# Patient Record
Sex: MALE | Race: WHITE | NOT HISPANIC OR LATINO | Employment: OTHER | URBAN - METROPOLITAN AREA
[De-identification: names, ages, dates, MRNs, and addresses within clinical notes are randomized per-mention and may not be internally consistent; named-entity substitution may affect disease eponyms.]

---

## 2023-10-23 ENCOUNTER — TELEPHONE (OUTPATIENT)
Dept: PRIMARY CARE | Facility: CLINIC | Age: 88
End: 2023-10-23

## 2023-10-23 DIAGNOSIS — Z00.00 WELL ADULT EXAM: ICD-10-CM

## 2023-10-23 DIAGNOSIS — I48.91 ATRIAL FIBRILLATION, UNSPECIFIED TYPE (MULTI): ICD-10-CM

## 2023-10-23 DIAGNOSIS — M47.816 ARTHRITIS, LUMBAR SPINE: Primary | ICD-10-CM

## 2023-10-23 PROBLEM — I35.0 AORTIC STENOSIS: Status: ACTIVE | Noted: 2023-10-23

## 2023-10-23 PROBLEM — C61 ADENOCARCINOMA OF PROSTATE (MULTI): Status: ACTIVE | Noted: 2023-10-23

## 2023-10-23 PROBLEM — E03.9 HYPOTHYROIDISM: Status: ACTIVE | Noted: 2023-10-23

## 2023-10-23 PROBLEM — N28.1 COMPLEX RENAL CYST: Status: ACTIVE | Noted: 2023-10-23

## 2023-10-23 PROBLEM — I25.10 CAD (CORONARY ARTERY DISEASE): Status: ACTIVE | Noted: 2023-10-23

## 2023-10-23 PROBLEM — I10 HYPERTENSION: Status: ACTIVE | Noted: 2023-10-23

## 2023-10-23 PROBLEM — K57.90 DIVERTICULOSIS: Status: ACTIVE | Noted: 2023-10-23

## 2023-10-23 PROBLEM — K21.9 GERD (GASTROESOPHAGEAL REFLUX DISEASE): Status: ACTIVE | Noted: 2023-10-23

## 2023-10-23 PROBLEM — R73.01 IMPAIRED FASTING GLUCOSE: Status: ACTIVE | Noted: 2023-10-23

## 2023-10-23 PROBLEM — R97.21 RISING PSA FOLLOWING TREATMENT FOR MALIGNANT NEOPLASM OF PROSTATE: Status: ACTIVE | Noted: 2023-10-23

## 2023-10-23 PROBLEM — R09.89 BILATERAL CAROTID BRUITS: Status: ACTIVE | Noted: 2023-10-23

## 2023-10-23 PROBLEM — R31.29 MICROSCOPIC HEMATURIA: Status: ACTIVE | Noted: 2023-10-23

## 2023-10-23 PROBLEM — Z85.828 HISTORY OF SKIN CANCER: Status: ACTIVE | Noted: 2023-10-23

## 2023-10-23 PROBLEM — M54.50 LUMBAR BACK PAIN: Status: ACTIVE | Noted: 2023-10-23

## 2023-10-23 PROBLEM — E78.5 HYPERLIPIDEMIA: Status: ACTIVE | Noted: 2023-10-23

## 2023-10-23 PROBLEM — L57.0 ACTINIC KERATOSIS: Status: ACTIVE | Noted: 2023-10-23

## 2023-10-23 NOTE — TELEPHONE ENCOUNTER
Patient contacted office requesting renewal of handicap placard    Please notify regarding the followin.  Placard is ready for mailing or pickup.  2.  Schedule wellness exam in December or January (please notify patient fasting lab work has been ordered which she can do 3 to 5 days prior at any  lab)  Shabbir Vauhgan MD

## 2024-02-06 ENCOUNTER — TELEPHONE (OUTPATIENT)
Dept: PRIMARY CARE | Facility: CLINIC | Age: 89
End: 2024-02-06
Payer: MEDICARE

## 2024-02-06 NOTE — TELEPHONE ENCOUNTER
I spoke with patient.  He was confused and thought that his visit in October was his physical/wellness visit.    Will plan to do physical in April (due to limited slots in April and his travel in March).  He is aware to complete fasting lab work prior to visit    Shabbir Vaughan MD

## 2024-02-06 NOTE — TELEPHONE ENCOUNTER
I was trying to schedule a MWV for Feb 2024 but Mr. Lockwood said it should be a regular office visit.  I mentioned that you ordered fasting labs.  Should he schedule a CPE/MWV or follow up?

## 2024-03-27 DIAGNOSIS — K21.9 GASTROESOPHAGEAL REFLUX DISEASE, UNSPECIFIED WHETHER ESOPHAGITIS PRESENT: ICD-10-CM

## 2024-03-27 RX ORDER — PANTOPRAZOLE SODIUM 40 MG/1
1 TABLET, DELAYED RELEASE ORAL DAILY
COMMUNITY
Start: 2014-11-06 | End: 2024-03-27 | Stop reason: SDUPTHER

## 2024-03-27 RX ORDER — METOPROLOL SUCCINATE 50 MG/1
1 TABLET, EXTENDED RELEASE ORAL DAILY
COMMUNITY
Start: 2017-03-17 | End: 2024-03-27

## 2024-03-27 RX ORDER — ROSUVASTATIN CALCIUM 10 MG/1
1 TABLET, COATED ORAL NIGHTLY
COMMUNITY
Start: 2017-09-06

## 2024-03-27 RX ORDER — AMLODIPINE BESYLATE 2.5 MG/1
2.5 TABLET ORAL
COMMUNITY
Start: 2023-10-09

## 2024-03-27 RX ORDER — LISINOPRIL AND HYDROCHLOROTHIAZIDE 12.5; 2 MG/1; MG/1
1 TABLET ORAL 2 TIMES DAILY
COMMUNITY
Start: 2023-06-30

## 2024-03-27 RX ORDER — FUROSEMIDE 40 MG/1
20 TABLET ORAL EVERY OTHER DAY
COMMUNITY
Start: 2023-09-22

## 2024-03-27 RX ORDER — IBUPROFEN 100 MG/5ML
1 SUSPENSION, ORAL (FINAL DOSE FORM) ORAL DAILY
COMMUNITY
End: 2024-03-27 | Stop reason: ALTCHOICE

## 2024-03-27 RX ORDER — LEVOTHYROXINE SODIUM 50 UG/1
1 TABLET ORAL DAILY
COMMUNITY
Start: 2017-06-15 | End: 2024-06-06 | Stop reason: SDUPTHER

## 2024-03-27 RX ORDER — APIXABAN 5 MG/1
1 TABLET, FILM COATED ORAL 2 TIMES DAILY
COMMUNITY
Start: 2023-09-05 | End: 2024-04-15 | Stop reason: SDUPTHER

## 2024-03-27 RX ORDER — PANTOPRAZOLE SODIUM 40 MG/1
40 TABLET, DELAYED RELEASE ORAL DAILY
Qty: 90 TABLET | Refills: 3 | Status: SHIPPED | OUTPATIENT
Start: 2024-03-27

## 2024-04-08 ENCOUNTER — OFFICE VISIT (OUTPATIENT)
Dept: PRIMARY CARE | Facility: CLINIC | Age: 89
End: 2024-04-08
Payer: MEDICARE

## 2024-04-08 ENCOUNTER — LAB (OUTPATIENT)
Dept: LAB | Facility: LAB | Age: 89
End: 2024-04-08
Payer: MEDICARE

## 2024-04-08 ENCOUNTER — HOSPITAL ENCOUNTER (OUTPATIENT)
Dept: RADIOLOGY | Facility: CLINIC | Age: 89
Discharge: HOME | End: 2024-04-08
Payer: MEDICARE

## 2024-04-08 VITALS
WEIGHT: 200 LBS | TEMPERATURE: 98 F | DIASTOLIC BLOOD PRESSURE: 80 MMHG | HEART RATE: 74 BPM | SYSTOLIC BLOOD PRESSURE: 138 MMHG | OXYGEN SATURATION: 98 %

## 2024-04-08 DIAGNOSIS — I10 PRIMARY HYPERTENSION: ICD-10-CM

## 2024-04-08 DIAGNOSIS — S91.302A WOUND, OPEN, FOOT, LEFT, INITIAL ENCOUNTER: Primary | ICD-10-CM

## 2024-04-08 DIAGNOSIS — Z00.00 WELL ADULT EXAM: ICD-10-CM

## 2024-04-08 DIAGNOSIS — I48.11 LONGSTANDING PERSISTENT ATRIAL FIBRILLATION (MULTI): ICD-10-CM

## 2024-04-08 DIAGNOSIS — S91.302A WOUND, OPEN, FOOT, LEFT, INITIAL ENCOUNTER: ICD-10-CM

## 2024-04-08 LAB
25(OH)D3 SERPL-MCNC: 10 NG/ML (ref 30–100)
ALBUMIN SERPL BCP-MCNC: 4.3 G/DL (ref 3.4–5)
ALP SERPL-CCNC: 57 U/L (ref 33–136)
ALT SERPL W P-5'-P-CCNC: 14 U/L (ref 10–52)
ANION GAP SERPL CALC-SCNC: 13 MMOL/L (ref 10–20)
APPEARANCE UR: CLEAR
AST SERPL W P-5'-P-CCNC: 19 U/L (ref 9–39)
BASOPHILS # BLD AUTO: 0.05 X10*3/UL (ref 0–0.1)
BASOPHILS NFR BLD AUTO: 0.9 %
BILIRUB SERPL-MCNC: 0.9 MG/DL (ref 0–1.2)
BILIRUB UR STRIP.AUTO-MCNC: NEGATIVE MG/DL
BUN SERPL-MCNC: 25 MG/DL (ref 6–23)
CALCIUM SERPL-MCNC: 9.3 MG/DL (ref 8.6–10.6)
CHLORIDE SERPL-SCNC: 103 MMOL/L (ref 98–107)
CHOLEST SERPL-MCNC: 130 MG/DL (ref 0–199)
CHOLESTEROL/HDL RATIO: 2.3
CO2 SERPL-SCNC: 29 MMOL/L (ref 21–32)
COLOR UR: YELLOW
CREAT SERPL-MCNC: 0.97 MG/DL (ref 0.5–1.3)
EGFRCR SERPLBLD CKD-EPI 2021: 74 ML/MIN/1.73M*2
EOSINOPHIL # BLD AUTO: 0.17 X10*3/UL (ref 0–0.4)
EOSINOPHIL NFR BLD AUTO: 3 %
ERYTHROCYTE [DISTWIDTH] IN BLOOD BY AUTOMATED COUNT: 14 % (ref 11.5–14.5)
EST. AVERAGE GLUCOSE BLD GHB EST-MCNC: 123 MG/DL
GLUCOSE SERPL-MCNC: 118 MG/DL (ref 74–99)
GLUCOSE UR STRIP.AUTO-MCNC: ABNORMAL MG/DL
HBA1C MFR BLD: 5.9 %
HCT VFR BLD AUTO: 44.2 % (ref 41–52)
HDLC SERPL-MCNC: 56.9 MG/DL
HGB BLD-MCNC: 14.2 G/DL (ref 13.5–17.5)
HYALINE CASTS #/AREA URNS AUTO: ABNORMAL /LPF
IMM GRANULOCYTES # BLD AUTO: 0.01 X10*3/UL (ref 0–0.5)
IMM GRANULOCYTES NFR BLD AUTO: 0.2 % (ref 0–0.9)
KETONES UR STRIP.AUTO-MCNC: NEGATIVE MG/DL
LDLC SERPL CALC-MCNC: 64 MG/DL
LEUKOCYTE ESTERASE UR QL STRIP.AUTO: NEGATIVE
LYMPHOCYTES # BLD AUTO: 1.08 X10*3/UL (ref 0.8–3)
LYMPHOCYTES NFR BLD AUTO: 19.1 %
MCH RBC QN AUTO: 32.7 PG (ref 26–34)
MCHC RBC AUTO-ENTMCNC: 32.1 G/DL (ref 32–36)
MCV RBC AUTO: 102 FL (ref 80–100)
MONOCYTES # BLD AUTO: 0.66 X10*3/UL (ref 0.05–0.8)
MONOCYTES NFR BLD AUTO: 11.7 %
MUCOUS THREADS #/AREA URNS AUTO: ABNORMAL /LPF
NEUTROPHILS # BLD AUTO: 3.69 X10*3/UL (ref 1.6–5.5)
NEUTROPHILS NFR BLD AUTO: 65.1 %
NITRITE UR QL STRIP.AUTO: NEGATIVE
NON HDL CHOLESTEROL: 73 MG/DL (ref 0–149)
NRBC BLD-RTO: 0 /100 WBCS (ref 0–0)
PH UR STRIP.AUTO: 6.5 [PH]
PLATELET # BLD AUTO: 244 X10*3/UL (ref 150–450)
POTASSIUM SERPL-SCNC: 4.4 MMOL/L (ref 3.5–5.3)
PROT SERPL-MCNC: 6.8 G/DL (ref 6.4–8.2)
PROT UR STRIP.AUTO-MCNC: NEGATIVE MG/DL
RBC # BLD AUTO: 4.34 X10*6/UL (ref 4.5–5.9)
RBC # UR STRIP.AUTO: ABNORMAL /UL
RBC #/AREA URNS AUTO: ABNORMAL /HPF
SODIUM SERPL-SCNC: 141 MMOL/L (ref 136–145)
SP GR UR STRIP.AUTO: 1.02
TRIGL SERPL-MCNC: 47 MG/DL (ref 0–149)
TSH SERPL-ACNC: 3.08 MIU/L (ref 0.44–3.98)
UROBILINOGEN UR STRIP.AUTO-MCNC: NORMAL MG/DL
VLDL: 9 MG/DL (ref 0–40)
WBC # BLD AUTO: 5.7 X10*3/UL (ref 4.4–11.3)
WBC #/AREA URNS AUTO: ABNORMAL /HPF

## 2024-04-08 PROCEDURE — 1159F MED LIST DOCD IN RCRD: CPT | Performed by: INTERNAL MEDICINE

## 2024-04-08 PROCEDURE — 84443 ASSAY THYROID STIM HORMONE: CPT

## 2024-04-08 PROCEDURE — 80061 LIPID PANEL: CPT

## 2024-04-08 PROCEDURE — 83036 HEMOGLOBIN GLYCOSYLATED A1C: CPT

## 2024-04-08 PROCEDURE — 81001 URINALYSIS AUTO W/SCOPE: CPT

## 2024-04-08 PROCEDURE — 82306 VITAMIN D 25 HYDROXY: CPT

## 2024-04-08 PROCEDURE — 85025 COMPLETE CBC W/AUTO DIFF WBC: CPT

## 2024-04-08 PROCEDURE — 80053 COMPREHEN METABOLIC PANEL: CPT

## 2024-04-08 PROCEDURE — 3075F SYST BP GE 130 - 139MM HG: CPT | Performed by: INTERNAL MEDICINE

## 2024-04-08 PROCEDURE — 73630 X-RAY EXAM OF FOOT: CPT | Mod: LEFT SIDE | Performed by: RADIOLOGY

## 2024-04-08 PROCEDURE — 3079F DIAST BP 80-89 MM HG: CPT | Performed by: INTERNAL MEDICINE

## 2024-04-08 PROCEDURE — 36415 COLL VENOUS BLD VENIPUNCTURE: CPT

## 2024-04-08 PROCEDURE — 1036F TOBACCO NON-USER: CPT | Performed by: INTERNAL MEDICINE

## 2024-04-08 PROCEDURE — 99213 OFFICE O/P EST LOW 20 MIN: CPT | Performed by: INTERNAL MEDICINE

## 2024-04-08 PROCEDURE — 73630 X-RAY EXAM OF FOOT: CPT | Mod: LT

## 2024-04-08 ASSESSMENT — ENCOUNTER SYMPTOMS
CHILLS: 0
WOUND: 1
OCCASIONAL FEELINGS OF UNSTEADINESS: 0
PALPITATIONS: 0
HEADACHES: 0
LOSS OF SENSATION IN FEET: 0
FEVER: 0
DEPRESSION: 0
SHORTNESS OF BREATH: 0

## 2024-04-08 NOTE — PATIENT INSTRUCTIONS
Left foot wound with central eschar (initial injury 3 weeks ago, bedside table fell on foot)  X-ray left foot  Lab work today.  Continue daily cleansing with soap and water and covering with antibiotic ointment and dry sterile dressing.  Referral to wound clinic    Persistent atrial fibrillation  Hypertension  Continue current medication regimen for now    Follow-up  Wellness exam next week as scheduled

## 2024-04-08 NOTE — PROGRESS NOTES
Subjective   Patient ID: Davon Lockwood is a 89 y.o. male who presents for Foot Injury (Left foot).    Acute visit    Left foot injury with open wound  3 weeks ago, nightstand fell on left foot with immediate bleeding and bruising.  Patient had a small blood blister at distal aspect which was drained and has improved.  Persistent dorsal foot wound with central eschar.  Also, persistent oozing (patient is on Eliquis)    Patient has been cleaning with soap and water and applying over-the-counter antibiotic ointment and dry dressing every 1 to 2 days.  No fever, chills.  No pain with weightbearing.  Initial swelling has improved.    Foot Injury          Review of Systems   Constitutional:  Negative for chills and fever.   Respiratory:  Negative for shortness of breath.    Cardiovascular:  Negative for chest pain and palpitations.   Skin:  Positive for wound.   Neurological:  Negative for headaches.       Objective   /80 (BP Location: Left arm, Patient Position: Sitting, BP Cuff Size: Adult)   Pulse 74   Temp 36.7 °C (98 °F)   Wt 90.7 kg (200 lb)   SpO2 98%     Physical Exam  Vitals reviewed.   HENT:      Head: Normocephalic.   Cardiovascular:      Rate and Rhythm: Normal rate. Rhythm irregular.   Pulmonary:      Effort: Pulmonary effort is normal.      Breath sounds: Normal breath sounds.   Skin:     Comments: Left foot with open wound with central necrosis.  Slight oozing of blood at periphery.  No purulent drainage.  No surrounding fluctuance or crepitus.  Minimal tenderness along medial aspect.  Left DP pulse intact         Assessment/Plan     Left foot wound with central eschar (initial injury 3 weeks ago, bedside table fell on foot)  X-ray left foot  Lab work today.  Continue daily cleansing with soap and water and covering with antibiotic ointment and dry sterile dressing.  Referral to wound clinic    Persistent atrial fibrillation  Hypertension  Continue current medication regimen for  now    Follow-up  Wellness exam next week as scheduled    Shabbir Vaughan MD

## 2024-04-09 ENCOUNTER — OFFICE VISIT (OUTPATIENT)
Dept: WOUND CARE | Facility: CLINIC | Age: 89
End: 2024-04-09
Payer: MEDICARE

## 2024-04-09 DIAGNOSIS — L97.522 ULCER OF TOE OF LEFT FOOT, WITH FAT LAYER EXPOSED (MULTI): ICD-10-CM

## 2024-04-09 DIAGNOSIS — I73.89 ACROCYANOSIS (CMS-HCC): ICD-10-CM

## 2024-04-09 DIAGNOSIS — I87.2 PERIPHERAL VENOUS INSUFFICIENCY: Primary | ICD-10-CM

## 2024-04-09 PROCEDURE — 11042 DBRDMT SUBQ TIS 1ST 20SQCM/<: CPT

## 2024-04-09 PROCEDURE — 99213 OFFICE O/P EST LOW 20 MIN: CPT | Mod: 25

## 2024-04-11 ENCOUNTER — HOSPITAL ENCOUNTER (OUTPATIENT)
Dept: RADIOLOGY | Facility: CLINIC | Age: 89
Discharge: HOME | End: 2024-04-11
Payer: MEDICARE

## 2024-04-11 DIAGNOSIS — L97.522 ULCER OF TOE OF LEFT FOOT, WITH FAT LAYER EXPOSED (MULTI): ICD-10-CM

## 2024-04-11 DIAGNOSIS — I87.2 PERIPHERAL VENOUS INSUFFICIENCY: ICD-10-CM

## 2024-04-11 PROCEDURE — 73718 MRI LOWER EXTREMITY W/O DYE: CPT | Mod: LEFT SIDE | Performed by: RADIOLOGY

## 2024-04-11 PROCEDURE — 73718 MRI LOWER EXTREMITY W/O DYE: CPT | Mod: LT

## 2024-04-15 ENCOUNTER — HOSPITAL ENCOUNTER (OUTPATIENT)
Dept: VASCULAR MEDICINE | Facility: CLINIC | Age: 89
Discharge: HOME | End: 2024-04-15
Payer: MEDICARE

## 2024-04-15 DIAGNOSIS — I73.9 PERIPHERAL VASCULAR DISEASE, UNSPECIFIED (CMS-HCC): ICD-10-CM

## 2024-04-15 DIAGNOSIS — I87.2 PERIPHERAL VENOUS INSUFFICIENCY: ICD-10-CM

## 2024-04-15 DIAGNOSIS — I48.11 LONGSTANDING PERSISTENT ATRIAL FIBRILLATION (MULTI): ICD-10-CM

## 2024-04-15 DIAGNOSIS — L97.522 ULCER OF TOE OF LEFT FOOT, WITH FAT LAYER EXPOSED (MULTI): ICD-10-CM

## 2024-04-15 PROCEDURE — 93922 UPR/L XTREMITY ART 2 LEVELS: CPT | Performed by: SURGERY

## 2024-04-15 PROCEDURE — 93922 UPR/L XTREMITY ART 2 LEVELS: CPT

## 2024-04-16 ENCOUNTER — APPOINTMENT (OUTPATIENT)
Dept: WOUND CARE | Facility: CLINIC | Age: 89
End: 2024-04-16
Payer: MEDICARE

## 2024-04-17 ENCOUNTER — OFFICE VISIT (OUTPATIENT)
Dept: PRIMARY CARE | Facility: CLINIC | Age: 89
End: 2024-04-17
Payer: MEDICARE

## 2024-04-17 VITALS
SYSTOLIC BLOOD PRESSURE: 130 MMHG | OXYGEN SATURATION: 97 % | DIASTOLIC BLOOD PRESSURE: 78 MMHG | HEIGHT: 70 IN | WEIGHT: 197 LBS | BODY MASS INDEX: 28.2 KG/M2 | HEART RATE: 60 BPM

## 2024-04-17 DIAGNOSIS — K21.9 GASTROESOPHAGEAL REFLUX DISEASE, UNSPECIFIED WHETHER ESOPHAGITIS PRESENT: ICD-10-CM

## 2024-04-17 DIAGNOSIS — Z95.2 S/P AVR (AORTIC VALVE REPLACEMENT): ICD-10-CM

## 2024-04-17 DIAGNOSIS — S92.345D NONDISPLACED FRACTURE OF FOURTH METATARSAL BONE, LEFT FOOT, SUBSEQUENT ENCOUNTER FOR FRACTURE WITH ROUTINE HEALING: ICD-10-CM

## 2024-04-17 DIAGNOSIS — E03.9 ACQUIRED HYPOTHYROIDISM: ICD-10-CM

## 2024-04-17 DIAGNOSIS — Z85.828 HISTORY OF SKIN CANCER: ICD-10-CM

## 2024-04-17 DIAGNOSIS — R97.21 RISING PSA FOLLOWING TREATMENT FOR MALIGNANT NEOPLASM OF PROSTATE: ICD-10-CM

## 2024-04-17 DIAGNOSIS — N28.1 COMPLEX RENAL CYST: ICD-10-CM

## 2024-04-17 DIAGNOSIS — I10 PRIMARY HYPERTENSION: ICD-10-CM

## 2024-04-17 DIAGNOSIS — L97.529: ICD-10-CM

## 2024-04-17 DIAGNOSIS — R31.29 MICROSCOPIC HEMATURIA: ICD-10-CM

## 2024-04-17 DIAGNOSIS — R73.01 IMPAIRED FASTING GLUCOSE: ICD-10-CM

## 2024-04-17 DIAGNOSIS — E78.2 MIXED HYPERLIPIDEMIA: ICD-10-CM

## 2024-04-17 DIAGNOSIS — I25.10 CORONARY ARTERY DISEASE INVOLVING NATIVE CORONARY ARTERY OF NATIVE HEART WITHOUT ANGINA PECTORIS: ICD-10-CM

## 2024-04-17 DIAGNOSIS — Z00.00 MEDICARE ANNUAL WELLNESS VISIT, SUBSEQUENT: Primary | ICD-10-CM

## 2024-04-17 DIAGNOSIS — Z00.00 WELLNESS EXAMINATION: ICD-10-CM

## 2024-04-17 DIAGNOSIS — C61 ADENOCARCINOMA OF PROSTATE (MULTI): ICD-10-CM

## 2024-04-17 DIAGNOSIS — E55.9 VITAMIN D DEFICIENCY: ICD-10-CM

## 2024-04-17 DIAGNOSIS — I48.11 LONGSTANDING PERSISTENT ATRIAL FIBRILLATION (MULTI): ICD-10-CM

## 2024-04-17 PROBLEM — M54.50 LUMBAR BACK PAIN: Status: RESOLVED | Noted: 2023-10-23 | Resolved: 2024-04-17

## 2024-04-17 PROBLEM — N20.0 KIDNEY STONES: Status: ACTIVE | Noted: 2024-04-17

## 2024-04-17 PROBLEM — N13.8 BPH WITH OBSTRUCTION/LOWER URINARY TRACT SYMPTOMS: Status: ACTIVE | Noted: 2021-04-07

## 2024-04-17 PROBLEM — N40.1 BPH WITH OBSTRUCTION/LOWER URINARY TRACT SYMPTOMS: Status: ACTIVE | Noted: 2021-04-07

## 2024-04-17 PROBLEM — N35.919 URETHRAL STRICTURE: Status: ACTIVE | Noted: 2019-06-12

## 2024-04-17 PROCEDURE — G0439 PPPS, SUBSEQ VISIT: HCPCS | Performed by: INTERNAL MEDICINE

## 2024-04-17 PROCEDURE — 3078F DIAST BP <80 MM HG: CPT | Performed by: INTERNAL MEDICINE

## 2024-04-17 PROCEDURE — 3075F SYST BP GE 130 - 139MM HG: CPT | Performed by: INTERNAL MEDICINE

## 2024-04-17 PROCEDURE — 1159F MED LIST DOCD IN RCRD: CPT | Performed by: INTERNAL MEDICINE

## 2024-04-17 PROCEDURE — 1036F TOBACCO NON-USER: CPT | Performed by: INTERNAL MEDICINE

## 2024-04-17 PROCEDURE — UHSPHYS PR UH SELECT PHYSICAL: Performed by: INTERNAL MEDICINE

## 2024-04-17 RX ORDER — ERGOCALCIFEROL 1.25 MG/1
50000 CAPSULE ORAL
Qty: 12 CAPSULE | Refills: 1 | Status: SHIPPED | OUTPATIENT
Start: 2024-04-21 | End: 2024-10-18

## 2024-04-17 NOTE — Clinical Note
Soraya Shea for seeing Ray regarding left foot wound. I see MRI that you ordered is notable for nondisplaced fourth metatarsal fracture. Patient is currently asymptomatic.  I have recommended that he weight-bear as tolerated, but wanted to message you in case you had other recommendations (as I know his next appointment with you is not till next week). Thank you, Jose

## 2024-04-17 NOTE — PATIENT INSTRUCTIONS
Wellness exam/physical ([facility])  Continued well-balanced diet rich in fruits, vegetables, fiber, lean protein recommended  Continued regular exercise 3 days a week recommended (after foot heals)  Continued routine follow-up with ophthalmology and dentistry recommended  Providing a copy of advance directives (DURABLE POWER OF  for healthcare) recommended    Coronary artery disease  Persistent atrial fibrillation  Aortic valve stenosis status post aortic valve replacement  History of diastolic heart failure  Primary hypertension  Hyperlipidemia  Lab work reviewed and values are at goal  Continue current medication regimen  Continue home blood pressure monitoring.  Bring readings and device to next visit.  Continue routine follow-up with [facility] cardiology, Dr. Russell    Hypothyroidism  Continue levothyroxine 50 mcg daily    Impaired fasting glucose (fasting blood sugar 118 and hemoglobin A1c 5.9, stable)  Maintain low carbohydrate/low sugar diet  Continue Jardiance 10 mg daily (originally prescribed for diastolic heart failure per cardiology)    GERD/heartburn  Continue pantoprazole 40 mg daily    Prostate cancer with elevated PSA (remote radiation therapy in 2008)  Complex renal cyst  Continue annual follow-up with [facility] urology, Dr. Miller, next appointment due in November    Chronic microscopic hematuria (history of negative workup including imaging and cystoscopy-last on 1/15/2020 at [facility])  Patient to contact Dr. Miller to review current urinalysis (which has slightly higher presence of microscopic hematuria)    Vitamin D deficiency (new diagnosis)  Start vitamin D 50,000 IU weekly  Vitamin D level to be checked prior to next visit    Traumatic ulcer of dorsal left foot  Nondisplaced fourth metatarsal fracture (identified on MRI), asymptomatic  Continue current daily dressing changes per wound clinic instructions  Continue to wear tennis shoe to provide support to foot, weight-bear as tolerated, use cane as  needed    History of skin cancer/actinic keratoses  Continue routine follow-up with dermatology, Dr. Bandar Mcguire    Follow-up  1.  Office visit in 4 months, August  (Fasting lab work ordered to complete prior to visit)  2.  Wellness exam/physical in 1 year, on/after 4/18/2025

## 2024-04-17 NOTE — PROGRESS NOTES
Davon Lockwood is a 70 year old here for a Medicare Annual Wellness Visit     Health Risk Assessment  In general, health is:  Good     Concerns with balance:  Some concerns     Concerns with teeth or dentures:  No     Concerns with sexual function:  No     Felt anxious, stressed, angry, irritable, lonely, isolated, or had thoughts of hurting themself:  No     Has little interest or pleasure in doing things:  No     Bothered by feeling down, depressed, or hopeless:  No     Needs help with grocery shopping, cooking, housework, bathing, grooming, dressing, eating, sitting or standing, walking, using the toilet, handling finances, taking medications, using the telephone, or driving:  No     Following safety precautions in the home environment and vehicle: removed throw rugs from floors, installed grab bars in the bathroom, handrails in stairwells, having adequate lighting, wearing seatbelt at all times?:  Yes     Smokes cigarettes, vapes, or chew tobacco:  No     Eats healthy foods including fruits, vegetables, whole grains, and fiber-rich foods:  Most days     Number of days per week engages in exercise:  3 times a week     Average alcohol consumption: 5 drinks per week        Current Providers  Specialists: I have reviewed specialist-related care of the patient in the medical record.     Medical/Family history review  Reviewed and updated problem list, medical/surgical/family/social history, medications, and allergies.     Opioid use review  Patient use of opioids:  No           Depression screening  Depression Screening PHQ-2 Score   2/14/2023 0         Depression screening tool completed and reviewed. Based on score and interview, patient is not at risk for depression. Screening tool discussed with patient, and I recommended no further intervention at this time.     Cognitive screening  Mini Cog Score:  5/5     Cognitive screening reviewed and plan:  Yes, no evaluation needed     Functional Observation  Was the  "patient's timed Up & Go test unsteady or >= 12 seconds?  No     Advance Care Planning  End of Life planning discussed, including patient's advanced directive wishes:  Yes    Vision and Hearing assessment  Visual acuity (required for Welcome to Medicare):  Ophthamology  Hearing Evaluation:  Normal hearing    ====================================================    /78 (BP Location: Left arm, Patient Position: Sitting, BP Cuff Size: Adult)   Pulse 60   Ht 1.778 m (5' 10\")   Wt 89.4 kg (197 lb)   SpO2 97%   BMI 28.27 kg/m²     Chief Complaint   Patient presents with    Annual Exam    Medicare Annual Wellness Visit Subsequent       Medicare annual wellness visit    =============================================    Wellness exam/physical ( [facility])    Coronary artery disease  Persistent atrial fibrillation  Aortic valve stenosis status post aortic valve replacement  History of diastolic heart failure  Primary hypertension  Hyperlipidemia  Patient denies any increased dyspnea on exertion, orthopnea, PND.  No increased weight or lower extremity edema.  Lab work reviewed and at goal.  Routine follow-up with [facility] cardiology, Dr. Russell, next appointment in July  BPs: Range between 105-140/65-80    Hypothyroidism  Stable with TSH at goal.  No unintentional weight gain or loss, constipation or diarrhea.    Impaired fasting glucose  Patient maintains proper diet.  Limited exercise since injuring foot.  He is on Jardiance for diastolic heart failure per cardiology.  Hemoglobin A1c 5.9%, stable.     GERD/heartburn  No breakthrough heartburn or dysphagia.  Remains on pantoprazole 40 mg daily.    Prostate cancer with elevated PSA (remote radiation therapy in 2008)  Complex renal cyst  Most recent PSA 8.7 in 9/2023.  Most recent visit with Dr. Miller in November.  Continued watchful waiting with 1 year follow-up recommended.  Continue annual follow-up with [facility] urology, Dr. Miller, next appointment due in " November    Chronic microscopic hematuria   Current urinalysis notable for 11/20 RBC per high-power field.  Patient has history of negative workup including imaging and cystoscopy-last on 1/15/2020 at [facility]    Vitamin D deficiency (new diagnosis)  Vitamin D level is low at 10.  Weekly vitamin D to be initiated.    Traumatic ulcer of dorsal left foot  Nondisplaced fourth metatarsal fracture (identified on MRI), asymptomatic  Patient is being followed in the wound clinic, next appointment next week.  He denies any pain in foot.  He is doing daily dressing changes including cleansing and covering with Betadine and dry sterile bandage.  There is less bleeding.  No purulent drainage.    History of skin cancer/actinic keratoses  Patient continues follow-up with dermatology, Dr. Bandar Mcguire, every 3 to 6 months    Health maintenance  Exercise: Fitness center with treadmill and weights 3 days a week  Ophthalmology: Up-to-date with Dr. Nguyen  Dermatology: See above  Dentistry: Up-to-date with Dr. Jg Crowder  Born in [place].  Education: [employer] for [employer] school, [employer].  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  Second marriage to Shagufta since 1999.          ROS     Physical Exam      Assessment and Plan    Medicare annual wellness visit (subsequent)  Continued well-balanced diet rich in fruits, vegetables, fiber, lean protein recommended  Continued regular exercise 3 days a week recommended (after foot heals)  Continued routine follow-up with ophthalmology and dentistry recommended  Providing a copy of advance directives (DURABLE POWER OF  for healthcare) recommended    ===================================    Wellness exam/physical ( [facility])  Continued well-balanced diet rich in fruits, vegetables, fiber, lean protein recommended  Continued regular exercise 3 days a  week recommended (after foot heals)  Continued routine follow-up with ophthalmology and dentistry recommended  Providing a copy of advance directives (DURABLE POWER OF  for healthcare) recommended    Coronary artery disease  Persistent atrial fibrillation  Aortic valve stenosis status post aortic valve replacement  History of diastolic heart failure  Primary hypertension  Hyperlipidemia  Lab work reviewed and values are at goal  Continue current medication regimen  Continue home blood pressure monitoring.  Bring readings and device to next visit.  Continue routine follow-up with [facility] cardiology, Dr. Russell    Hypothyroidism  Continue levothyroxine 50 mcg daily    Impaired fasting glucose (fasting blood sugar 118 and hemoglobin A1c 5.9, stable)  Maintain low carbohydrate/low sugar diet  Continue Jardiance 10 mg daily (originally prescribed for diastolic heart failure per cardiology)    GERD/heartburn  Continue pantoprazole 40 mg daily    Prostate cancer with elevated PSA (remote radiation therapy in 2008)  Complex renal cyst  Continue annual follow-up with [facility] urology, Dr. Miller, next appointment due in November    Chronic microscopic hematuria (history of negative workup including imaging and cystoscopy-last on 1/15/2020 at [facility])  Patient to contact Dr. Miller to review current urinalysis (which has slightly higher presence of microscopic hematuria)    Vitamin D deficiency (new diagnosis)  Start vitamin D 50,000 IU weekly  Vitamin D level to be checked prior to next visit    Traumatic ulcer of dorsal left foot  Nondisplaced fourth metatarsal fracture (identified on MRI), asymptomatic  Continue current daily dressing changes per wound clinic instructions  Continue to wear tennis shoe to provide support to foot, weight-bear as tolerated, use cane as needed    History of skin cancer/actinic keratoses  Continue routine follow-up with dermatology, Dr. Bandar Mcguire    Follow-up  1.  Office visit in 4 months,  August  (Fasting lab work ordered to complete prior to visit)  2.  Wellness exam/physical in 1 year, on/after 4/18/2025    Shabbir Vaughan MD

## 2024-04-17 NOTE — LETTER
2024    Tony Miller MD  [facility] Department of Urology    Re:  Davon Lockwood ( 10/20/1932)      Dear Tony:    I hope that you are doing well.  I saw our mutual patient, Adebayo, for wellness exam today.    As you know, he follows with you for prostate cancer (remote external beam radiation, elevated PSA) on watchful waiting.  He also has a history of chronic microscopic hematuria, previous workup which included negative cystoscopy in 2020.    Current microscopic urinalysis with higher degree of RBCs.  Patient remains asymptomatic.    I thought I would share this information with you in case there were need for rate to see you sooner than the currently scheduled appointment in November.      Sincerely,        Shabbir Vaughan MD  [facility]  [address] [place] [address], [place] [address]    Phone:  [phone]  Fax:  [phone]

## 2024-04-23 ENCOUNTER — OFFICE VISIT (OUTPATIENT)
Dept: WOUND CARE | Facility: CLINIC | Age: 89
End: 2024-04-23
Payer: MEDICARE

## 2024-04-23 PROCEDURE — 10140 I&D HMTMA SEROMA/FLUID COLLJ: CPT

## 2024-04-24 ENCOUNTER — APPOINTMENT (OUTPATIENT)
Dept: RADIOLOGY | Facility: HOSPITAL | Age: 89
End: 2024-04-24
Payer: MEDICARE

## 2024-04-24 ENCOUNTER — APPOINTMENT (OUTPATIENT)
Dept: VASCULAR MEDICINE | Facility: HOSPITAL | Age: 89
End: 2024-04-24
Payer: MEDICARE

## 2024-04-30 ENCOUNTER — OFFICE VISIT (OUTPATIENT)
Dept: WOUND CARE | Facility: CLINIC | Age: 89
End: 2024-04-30
Payer: MEDICARE

## 2024-04-30 PROCEDURE — 11042 DBRDMT SUBQ TIS 1ST 20SQCM/<: CPT

## 2024-05-07 ENCOUNTER — OFFICE VISIT (OUTPATIENT)
Dept: WOUND CARE | Facility: CLINIC | Age: 89
End: 2024-05-07
Payer: MEDICARE

## 2024-05-07 ENCOUNTER — APPOINTMENT (OUTPATIENT)
Dept: WOUND CARE | Facility: CLINIC | Age: 89
End: 2024-05-07
Payer: MEDICARE

## 2024-05-07 PROCEDURE — 99213 OFFICE O/P EST LOW 20 MIN: CPT | Mod: 25

## 2024-05-07 PROCEDURE — 11042 DBRDMT SUBQ TIS 1ST 20SQCM/<: CPT

## 2024-05-14 ENCOUNTER — OFFICE VISIT (OUTPATIENT)
Dept: WOUND CARE | Facility: CLINIC | Age: 89
End: 2024-05-14
Payer: MEDICARE

## 2024-05-14 ENCOUNTER — APPOINTMENT (OUTPATIENT)
Dept: WOUND CARE | Facility: CLINIC | Age: 89
End: 2024-05-14
Payer: MEDICARE

## 2024-05-14 PROCEDURE — 11042 DBRDMT SUBQ TIS 1ST 20SQCM/<: CPT

## 2024-05-21 ENCOUNTER — APPOINTMENT (OUTPATIENT)
Dept: WOUND CARE | Facility: CLINIC | Age: 89
End: 2024-05-21
Payer: MEDICARE

## 2024-05-28 ENCOUNTER — OFFICE VISIT (OUTPATIENT)
Dept: WOUND CARE | Facility: CLINIC | Age: 89
End: 2024-05-28
Payer: MEDICARE

## 2024-05-28 PROCEDURE — 11042 DBRDMT SUBQ TIS 1ST 20SQCM/<: CPT

## 2024-06-04 ENCOUNTER — APPOINTMENT (OUTPATIENT)
Dept: WOUND CARE | Facility: CLINIC | Age: 89
End: 2024-06-04
Payer: MEDICARE

## 2024-06-06 DIAGNOSIS — E03.9 ACQUIRED HYPOTHYROIDISM: ICD-10-CM

## 2024-06-06 RX ORDER — LEVOTHYROXINE SODIUM 50 UG/1
50 TABLET ORAL DAILY
Qty: 90 TABLET | Refills: 3 | Status: SHIPPED | OUTPATIENT
Start: 2024-06-06

## 2024-06-06 NOTE — TELEPHONE ENCOUNTER
Levothyroxine 50 mcg pended for [facility].    Lab Results   Component Value Date    TSH 3.08 04/08/2024    Please sign.  Thanks

## 2024-06-11 ENCOUNTER — APPOINTMENT (OUTPATIENT)
Dept: WOUND CARE | Facility: CLINIC | Age: 89
End: 2024-06-11
Payer: MEDICARE

## 2024-06-23 DIAGNOSIS — E55.9 VITAMIN D DEFICIENCY: ICD-10-CM

## 2024-06-23 RX ORDER — ERGOCALCIFEROL 1.25 1/1
1 CAPSULE ORAL
Qty: 13 CAPSULE | Refills: 3 | Status: SHIPPED | OUTPATIENT
Start: 2024-06-23

## 2024-07-10 ENCOUNTER — TELEPHONE (OUTPATIENT)
Dept: PRIMARY CARE | Facility: CLINIC | Age: 89
End: 2024-07-10
Payer: MEDICARE

## 2024-07-10 NOTE — TELEPHONE ENCOUNTER
Patient should continue vitamin D 50,000 IU weekly until next visit with me.  Remind him to complete lab work which includes vitamin D level prior to next visit.    Shabbir Vaughan MD

## 2024-07-10 NOTE — TELEPHONE ENCOUNTER
Patient was informed that he should complete labs prior to his visit.  He was also informed that [facility] should have refills left on his Vit D prescription that was sent on 6-23-24 (#13 with 3 refills).

## 2024-08-01 DIAGNOSIS — I10 PRIMARY HYPERTENSION: ICD-10-CM

## 2024-08-01 RX ORDER — LISINOPRIL AND HYDROCHLOROTHIAZIDE 12.5; 2 MG/1; MG/1
1 TABLET ORAL 2 TIMES DAILY
Qty: 180 TABLET | Refills: 3 | Status: SHIPPED | OUTPATIENT
Start: 2024-08-01

## 2024-08-14 DIAGNOSIS — E78.2 MIXED HYPERLIPIDEMIA: ICD-10-CM

## 2024-08-14 RX ORDER — ROSUVASTATIN CALCIUM 10 MG/1
10 TABLET, COATED ORAL NIGHTLY
Qty: 90 TABLET | Refills: 1 | Status: SHIPPED | OUTPATIENT
Start: 2024-08-14

## 2024-08-22 ENCOUNTER — APPOINTMENT (OUTPATIENT)
Dept: PRIMARY CARE | Facility: CLINIC | Age: 89
End: 2024-08-22
Payer: MEDICARE

## 2024-08-26 DIAGNOSIS — I48.11 LONGSTANDING PERSISTENT ATRIAL FIBRILLATION (MULTI): ICD-10-CM

## 2024-08-27 RX ORDER — APIXABAN 5 MG/1
5 TABLET, FILM COATED ORAL 2 TIMES DAILY
Qty: 180 TABLET | Refills: 1 | Status: SHIPPED | OUTPATIENT
Start: 2024-08-27

## 2024-08-29 ENCOUNTER — LAB (OUTPATIENT)
Dept: LAB | Facility: LAB | Age: 89
End: 2024-08-29
Payer: MEDICARE

## 2024-08-29 DIAGNOSIS — E55.9 VITAMIN D DEFICIENCY: ICD-10-CM

## 2024-08-29 DIAGNOSIS — E03.9 ACQUIRED HYPOTHYROIDISM: ICD-10-CM

## 2024-08-29 DIAGNOSIS — I48.11 LONGSTANDING PERSISTENT ATRIAL FIBRILLATION (MULTI): ICD-10-CM

## 2024-08-29 DIAGNOSIS — R73.01 IMPAIRED FASTING GLUCOSE: ICD-10-CM

## 2024-08-29 LAB
25(OH)D3 SERPL-MCNC: 84 NG/ML (ref 30–100)
ANION GAP SERPL CALC-SCNC: 16 MMOL/L (ref 10–20)
BASOPHILS # BLD AUTO: 0.04 X10*3/UL (ref 0–0.1)
BASOPHILS NFR BLD AUTO: 0.8 %
BUN SERPL-MCNC: 24 MG/DL (ref 6–23)
CALCIUM SERPL-MCNC: 9.8 MG/DL (ref 8.6–10.6)
CHLORIDE SERPL-SCNC: 101 MMOL/L (ref 98–107)
CO2 SERPL-SCNC: 29 MMOL/L (ref 21–32)
CREAT SERPL-MCNC: 0.97 MG/DL (ref 0.5–1.3)
EGFRCR SERPLBLD CKD-EPI 2021: 74 ML/MIN/1.73M*2
EOSINOPHIL # BLD AUTO: 0.27 X10*3/UL (ref 0–0.4)
EOSINOPHIL NFR BLD AUTO: 5.4 %
ERYTHROCYTE [DISTWIDTH] IN BLOOD BY AUTOMATED COUNT: 14.1 % (ref 11.5–14.5)
GLUCOSE SERPL-MCNC: 130 MG/DL (ref 74–99)
HCT VFR BLD AUTO: 47.8 % (ref 41–52)
HGB BLD-MCNC: 15.5 G/DL (ref 13.5–17.5)
IMM GRANULOCYTES # BLD AUTO: 0.02 X10*3/UL (ref 0–0.5)
IMM GRANULOCYTES NFR BLD AUTO: 0.4 % (ref 0–0.9)
LYMPHOCYTES # BLD AUTO: 1.45 X10*3/UL (ref 0.8–3)
LYMPHOCYTES NFR BLD AUTO: 29.1 %
MCH RBC QN AUTO: 32.8 PG (ref 26–34)
MCHC RBC AUTO-ENTMCNC: 32.4 G/DL (ref 32–36)
MCV RBC AUTO: 101 FL (ref 80–100)
MONOCYTES # BLD AUTO: 0.55 X10*3/UL (ref 0.05–0.8)
MONOCYTES NFR BLD AUTO: 11 %
NEUTROPHILS # BLD AUTO: 2.65 X10*3/UL (ref 1.6–5.5)
NEUTROPHILS NFR BLD AUTO: 53.3 %
NRBC BLD-RTO: 0 /100 WBCS (ref 0–0)
PLATELET # BLD AUTO: 180 X10*3/UL (ref 150–450)
POTASSIUM SERPL-SCNC: 4.1 MMOL/L (ref 3.5–5.3)
RBC # BLD AUTO: 4.72 X10*6/UL (ref 4.5–5.9)
SODIUM SERPL-SCNC: 142 MMOL/L (ref 136–145)
TSH SERPL-ACNC: 2.49 MIU/L (ref 0.44–3.98)
WBC # BLD AUTO: 5 X10*3/UL (ref 4.4–11.3)

## 2024-08-29 PROCEDURE — 36415 COLL VENOUS BLD VENIPUNCTURE: CPT

## 2024-09-04 ENCOUNTER — APPOINTMENT (OUTPATIENT)
Dept: PRIMARY CARE | Facility: CLINIC | Age: 89
End: 2024-09-04
Payer: MEDICARE

## 2024-09-04 VITALS
HEART RATE: 50 BPM | OXYGEN SATURATION: 98 % | BODY MASS INDEX: 27.69 KG/M2 | WEIGHT: 193 LBS | SYSTOLIC BLOOD PRESSURE: 130 MMHG | DIASTOLIC BLOOD PRESSURE: 60 MMHG

## 2024-09-04 DIAGNOSIS — R73.01 IMPAIRED FASTING GLUCOSE: ICD-10-CM

## 2024-09-04 DIAGNOSIS — M47.816 ARTHRITIS, LUMBAR SPINE: ICD-10-CM

## 2024-09-04 DIAGNOSIS — C61 ADENOCARCINOMA OF PROSTATE (MULTI): ICD-10-CM

## 2024-09-04 DIAGNOSIS — I48.11 LONGSTANDING PERSISTENT ATRIAL FIBRILLATION (MULTI): Primary | ICD-10-CM

## 2024-09-04 DIAGNOSIS — I25.10 CORONARY ARTERY DISEASE INVOLVING NATIVE CORONARY ARTERY OF NATIVE HEART WITHOUT ANGINA PECTORIS: ICD-10-CM

## 2024-09-04 DIAGNOSIS — Z85.828 HISTORY OF SKIN CANCER: ICD-10-CM

## 2024-09-04 DIAGNOSIS — I10 PRIMARY HYPERTENSION: ICD-10-CM

## 2024-09-04 DIAGNOSIS — Z95.2 S/P AVR (AORTIC VALVE REPLACEMENT): ICD-10-CM

## 2024-09-04 DIAGNOSIS — K21.9 GASTROESOPHAGEAL REFLUX DISEASE, UNSPECIFIED WHETHER ESOPHAGITIS PRESENT: ICD-10-CM

## 2024-09-04 DIAGNOSIS — E78.2 MIXED HYPERLIPIDEMIA: ICD-10-CM

## 2024-09-04 DIAGNOSIS — E55.9 VITAMIN D DEFICIENCY: ICD-10-CM

## 2024-09-04 DIAGNOSIS — E03.9 ACQUIRED HYPOTHYROIDISM: ICD-10-CM

## 2024-09-04 DIAGNOSIS — R26.9 GAIT DISORDER: ICD-10-CM

## 2024-09-04 PROBLEM — S92.345D: Status: RESOLVED | Noted: 2024-04-17 | Resolved: 2024-09-04

## 2024-09-04 PROBLEM — L97.529: Status: RESOLVED | Noted: 2024-04-17 | Resolved: 2024-09-04

## 2024-09-04 PROCEDURE — 99214 OFFICE O/P EST MOD 30 MIN: CPT | Performed by: INTERNAL MEDICINE

## 2024-09-04 PROCEDURE — 3075F SYST BP GE 130 - 139MM HG: CPT | Performed by: INTERNAL MEDICINE

## 2024-09-04 PROCEDURE — 1036F TOBACCO NON-USER: CPT | Performed by: INTERNAL MEDICINE

## 2024-09-04 PROCEDURE — 3078F DIAST BP <80 MM HG: CPT | Performed by: INTERNAL MEDICINE

## 2024-09-04 PROCEDURE — 1159F MED LIST DOCD IN RCRD: CPT | Performed by: INTERNAL MEDICINE

## 2024-09-04 RX ORDER — ACETAMINOPHEN 500 MG
2000 TABLET ORAL DAILY
Start: 2024-09-04

## 2024-09-04 ASSESSMENT — ENCOUNTER SYMPTOMS
HEADACHES: 0
PALPITATIONS: 0
WEAKNESS: 0
DIZZINESS: 0
DIARRHEA: 0
COUGH: 0
DYSURIA: 0
BLOOD IN STOOL: 0
FATIGUE: 0
CONSTIPATION: 0
BACK PAIN: 1
HEMATURIA: 0

## 2024-09-04 NOTE — PATIENT INSTRUCTIONS
Coronary artery disease  Persistent atrial fibrillation  Aortic valve stenosis status post aortic valve replacement  History of diastolic heart failure  Primary hypertension  Hyperlipidemia  Continue current medication regimen  Continue home blood pressure monitoring.  Bring readings and device to next visit.  Continue routine follow-up with [facility] cardiology, Dr. Russell, next visit in 2/2025     Hypothyroidism  Continue levothyroxine 50 mcg daily     Impaired fasting glucose   Maintain low carbohydrate/low sugar diet  Continue Jardiance 10 mg daily (originally prescribed for diastolic heart failure per cardiology)     GERD/heartburn  Continue pantoprazole 40 mg daily     Prostate cancer with elevated PSA (remote radiation therapy in 2008)  Complex renal cyst  Continue annual follow-up with [facility] urology, Dr. Miller, next appointment in November     Chronic microscopic hematuria (history of negative workup including imaging and cystoscopy-last on 1/15/2020 at [facility])  Monitor expectantly with urology     Vitamin D deficiency (vitamin D level improved on prescription vitamin D)  Stop vitamin D 50,000 IU weekly  Start over-the-counter vitamin D 2000 IU daily (with food)     History of skin cancer/actinic keratoses  Continue routine follow-up with dermatology, Dr. Bandar Mcguire     Lumbar degenerative arthritis with kyphosis/secondary gait disorder  Keep active as able  Option of referral to physical therapy discussed, patient to consider in future  Continued use of ambulatory ambulatory device recommended, currently cane.  Option of rollator discussed.    Health maintenance  High-dose influenza and COVID-19 vaccine recommended in September    Follow-up  1.  Office visit in December  (Fasting lab work to be done 3 to 5 days prior)  2.  Wellness exam/physical in April 2025 (currently scheduled)

## 2024-09-04 NOTE — PROGRESS NOTES
Subjective   Patient ID: Davon Lockwood is a 89 y.o. male who presents for Follow-up.    Routine follow-up    Coronary artery disease  Persistent atrial fibrillation  Aortic valve stenosis status post aortic valve replacement  History of diastolic heart failure  Primary hypertension  Hyperlipidemia  No exertional chest pain or shortness of breath, palpitations, lightheadedness.  No black stools or blood in urine.  Continue current medication regimen  [facility] cardiology, Dr. Russell, next visit in 2/2025     Hypothyroidism  No unintentional weight gain or loss, constipation or diarrhea.  TSH is at goal on current dose of levothyroxine.     Impaired fasting glucose   Patient maintains proper diet, though he does like carbohydrates.  He remains active with exercise.  He is on Jardiance for heart failure which has been beneficial for glucoses as well    GERD/heartburn  No breakthrough symptoms or dysphagia.  Patient remains on PPI therapy.     Prostate cancer with elevated PSA (remote radiation therapy in 2008)  Complex renal cyst  Annual follow-up with [facility] urology, Dr. Miller, next appointment scheduled in November     Chronic microscopic hematuria   Prior negative workup with cystoscopy completed in 1/2020      Vitamin D deficiency  Vitamin D 50,000 IU weekly started at last visit in April.   Current vitamin D level is 84, plan to change to daily lower dose of vitamin D     History of skin cancer/actinic keratoses  Patient with recent squamous cell carcinoma removed, other upcoming Mohs procedure scheduled with Dr. Whitaker     Lumbar degenerative arthritis with kyphosis/secondary gait disorder  Patient is using a cane for support.  He has minimal back pain and denies any radicular leg pain/numbness/weakness  He does have postural kyphosis as well.  Option of referral to physical therapy for potential benefit of posture discussed which patient would like to defer at this time.  Use of alternative ambulatory device such  as rollator suggested    Health maintenance  Patient continues using treadmill and doing upper extremity exercise at fitness center 3 days a week  High-dose influenza and COVID-19 vaccine recommended in September, discussed             Review of Systems   Constitutional:  Negative for fatigue.   Respiratory:  Negative for cough.    Cardiovascular:  Negative for chest pain, palpitations and leg swelling.   Gastrointestinal:  Negative for blood in stool, constipation and diarrhea.   Genitourinary:  Negative for dysuria and hematuria.   Musculoskeletal:  Positive for back pain and gait problem.   Neurological:  Negative for dizziness, weakness and headaches.       Objective   /60 (BP Location: Left arm, Patient Position: Sitting, BP Cuff Size: Adult)   Pulse 50   Wt 87.5 kg (193 lb)   SpO2 98%   BMI 27.69 kg/m²     Physical Exam  Vitals reviewed.   HENT:      Mouth/Throat:      Mouth: Mucous membranes are moist.   Eyes:      Conjunctiva/sclera: Conjunctivae normal.   Cardiovascular:      Rate and Rhythm: Normal rate. Rhythm irregular.      Heart sounds: No murmur heard.  Pulmonary:      Effort: Pulmonary effort is normal.      Breath sounds: Normal breath sounds. No rales.   Abdominal:      General: Bowel sounds are normal.      Tenderness: There is no abdominal tenderness.   Musculoskeletal:      Right lower leg: No edema.      Left lower leg: No edema.   Neurological:      Comments: Motor exam: 5/5 strength in both lower extremities   Psychiatric:         Mood and Affect: Mood normal.         Assessment/Plan     Coronary artery disease  Persistent atrial fibrillation  Aortic valve stenosis status post aortic valve replacement  History of diastolic heart failure  Primary hypertension  Hyperlipidemia  Continue current medication regimen  Continue home blood pressure monitoring.  Bring readings and device to next visit.  Continue routine follow-up with [facility] cardiology, Dr. Russell, next visit in 2/2025      Hypothyroidism  Continue levothyroxine 50 mcg daily     Impaired fasting glucose   Maintain low carbohydrate/low sugar diet  Continue Jardiance 10 mg daily (originally prescribed for diastolic heart failure per cardiology)     GERD/heartburn  Continue pantoprazole 40 mg daily     Prostate cancer with elevated PSA (remote radiation therapy in 2008)  Complex renal cyst  Continue annual follow-up with [facility] urology, Dr. Miller, next appointment in November     Chronic microscopic hematuria (history of negative workup including imaging and cystoscopy-last on 1/15/2020 at [facility])  Monitor expectantly with urology     Vitamin D deficiency (vitamin D level improved on prescription vitamin D)  Stop vitamin D 50,000 IU weekly  Start over-the-counter vitamin D 2000 IU daily (with food)     History of skin cancer/actinic keratoses  Continue routine follow-up with dermatology, Dr. Bandar Mcguire     Lumbar degenerative arthritis with kyphosis/secondary gait disorder  Keep active as able  Option of referral to physical therapy discussed, patient to consider in future  Continued use of ambulatory ambulatory device recommended, currently cane.  Option of rollator discussed.    Health maintenance  High-dose influenza and COVID-19 vaccine recommended in September    Follow-up  1.  Office visit in December  (Fasting lab work to be done 3 to 5 days prior)  2.  Wellness exam/physical in April 2025 (currently scheduled)    Shabbir Vaughan MD

## 2024-11-14 ENCOUNTER — TELEPHONE (OUTPATIENT)
Dept: PRIMARY CARE | Facility: CLINIC | Age: 89
End: 2024-11-14
Payer: MEDICARE

## 2024-11-14 DIAGNOSIS — R05.1 ACUTE COUGH: ICD-10-CM

## 2024-11-14 RX ORDER — BENZONATATE 100 MG/1
100 CAPSULE ORAL 3 TIMES DAILY PRN
Qty: 42 CAPSULE | Refills: 0 | Status: SHIPPED | OUTPATIENT
Start: 2024-11-14 | End: 2024-12-14

## 2024-11-14 NOTE — TELEPHONE ENCOUNTER
Prescription has been signed and sent.  Please advise patient to keep us posted if his symptoms do not improve or at any point get worse.    Shabbir Vaughan MD

## 2024-11-14 NOTE — TELEPHONE ENCOUNTER
Ray complains of cough, ST, laryngitis. No fever. He would a prescription for Tessalon perles.  Please sign.  Thanks

## 2024-11-23 DIAGNOSIS — I10 PRIMARY HYPERTENSION: ICD-10-CM

## 2024-11-23 RX ORDER — AMLODIPINE BESYLATE 2.5 MG/1
2.5 TABLET ORAL DAILY
Qty: 90 TABLET | Refills: 3 | Status: SHIPPED | OUTPATIENT
Start: 2024-11-23

## 2024-11-26 ENCOUNTER — LAB (OUTPATIENT)
Dept: LAB | Facility: LAB | Age: 89
End: 2024-11-26
Payer: MEDICARE

## 2024-11-26 DIAGNOSIS — R73.01 IMPAIRED FASTING GLUCOSE: ICD-10-CM

## 2024-11-26 DIAGNOSIS — E55.9 VITAMIN D DEFICIENCY: ICD-10-CM

## 2024-11-26 DIAGNOSIS — I48.11 LONGSTANDING PERSISTENT ATRIAL FIBRILLATION (MULTI): ICD-10-CM

## 2024-11-26 LAB
25(OH)D3 SERPL-MCNC: 52 NG/ML (ref 30–100)
ANION GAP SERPL CALC-SCNC: 11 MMOL/L (ref 10–20)
BASOPHILS # BLD AUTO: 0.03 X10*3/UL (ref 0–0.1)
BASOPHILS NFR BLD AUTO: 0.6 %
BUN SERPL-MCNC: 31 MG/DL (ref 6–23)
CALCIUM SERPL-MCNC: 9.8 MG/DL (ref 8.6–10.6)
CHLORIDE SERPL-SCNC: 101 MMOL/L (ref 98–107)
CO2 SERPL-SCNC: 34 MMOL/L (ref 21–32)
CREAT SERPL-MCNC: 1.12 MG/DL (ref 0.5–1.3)
EGFRCR SERPLBLD CKD-EPI 2021: 62 ML/MIN/1.73M*2
EOSINOPHIL # BLD AUTO: 0.26 X10*3/UL (ref 0–0.4)
EOSINOPHIL NFR BLD AUTO: 5.4 %
ERYTHROCYTE [DISTWIDTH] IN BLOOD BY AUTOMATED COUNT: 13.2 % (ref 11.5–14.5)
GLUCOSE SERPL-MCNC: 160 MG/DL (ref 74–99)
HCT VFR BLD AUTO: 44.4 % (ref 41–52)
HGB BLD-MCNC: 14.3 G/DL (ref 13.5–17.5)
IMM GRANULOCYTES # BLD AUTO: 0.01 X10*3/UL (ref 0–0.5)
IMM GRANULOCYTES NFR BLD AUTO: 0.2 % (ref 0–0.9)
LYMPHOCYTES # BLD AUTO: 1.13 X10*3/UL (ref 0.8–3)
LYMPHOCYTES NFR BLD AUTO: 23.4 %
MCH RBC QN AUTO: 33.5 PG (ref 26–34)
MCHC RBC AUTO-ENTMCNC: 32.2 G/DL (ref 32–36)
MCV RBC AUTO: 104 FL (ref 80–100)
MONOCYTES # BLD AUTO: 0.53 X10*3/UL (ref 0.05–0.8)
MONOCYTES NFR BLD AUTO: 11 %
NEUTROPHILS # BLD AUTO: 2.87 X10*3/UL (ref 1.6–5.5)
NEUTROPHILS NFR BLD AUTO: 59.4 %
NRBC BLD-RTO: 0 /100 WBCS (ref 0–0)
PLATELET # BLD AUTO: 166 X10*3/UL (ref 150–450)
POTASSIUM SERPL-SCNC: 4 MMOL/L (ref 3.5–5.3)
RBC # BLD AUTO: 4.27 X10*6/UL (ref 4.5–5.9)
SODIUM SERPL-SCNC: 142 MMOL/L (ref 136–145)
WBC # BLD AUTO: 4.8 X10*3/UL (ref 4.4–11.3)

## 2024-11-26 PROCEDURE — 85025 COMPLETE CBC W/AUTO DIFF WBC: CPT

## 2024-11-26 PROCEDURE — 36415 COLL VENOUS BLD VENIPUNCTURE: CPT

## 2024-11-26 PROCEDURE — 80048 BASIC METABOLIC PNL TOTAL CA: CPT

## 2024-11-26 PROCEDURE — 82306 VITAMIN D 25 HYDROXY: CPT

## 2024-12-04 ENCOUNTER — APPOINTMENT (OUTPATIENT)
Dept: PRIMARY CARE | Facility: CLINIC | Age: 89
End: 2024-12-04
Payer: MEDICARE

## 2024-12-04 VITALS
HEART RATE: 50 BPM | BODY MASS INDEX: 27.41 KG/M2 | WEIGHT: 191 LBS | SYSTOLIC BLOOD PRESSURE: 130 MMHG | OXYGEN SATURATION: 95 % | DIASTOLIC BLOOD PRESSURE: 62 MMHG

## 2024-12-04 DIAGNOSIS — R73.01 IMPAIRED FASTING GLUCOSE: ICD-10-CM

## 2024-12-04 DIAGNOSIS — I48.11 LONGSTANDING PERSISTENT ATRIAL FIBRILLATION (MULTI): Primary | ICD-10-CM

## 2024-12-04 DIAGNOSIS — Z85.828 HISTORY OF SKIN CANCER: ICD-10-CM

## 2024-12-04 DIAGNOSIS — J30.0 VASOMOTOR RHINITIS: ICD-10-CM

## 2024-12-04 DIAGNOSIS — L98.9 SKIN LESION OF RIGHT ARM: ICD-10-CM

## 2024-12-04 DIAGNOSIS — Z23 NEED FOR VACCINATION: ICD-10-CM

## 2024-12-04 DIAGNOSIS — E03.9 ACQUIRED HYPOTHYROIDISM: ICD-10-CM

## 2024-12-04 DIAGNOSIS — E55.9 VITAMIN D DEFICIENCY: ICD-10-CM

## 2024-12-04 DIAGNOSIS — E78.2 MIXED HYPERLIPIDEMIA: ICD-10-CM

## 2024-12-04 DIAGNOSIS — I25.10 CORONARY ARTERY DISEASE INVOLVING NATIVE CORONARY ARTERY OF NATIVE HEART WITHOUT ANGINA PECTORIS: ICD-10-CM

## 2024-12-04 DIAGNOSIS — R97.21 RISING PSA FOLLOWING TREATMENT FOR MALIGNANT NEOPLASM OF PROSTATE: ICD-10-CM

## 2024-12-04 DIAGNOSIS — C61 ADENOCARCINOMA OF PROSTATE (MULTI): ICD-10-CM

## 2024-12-04 DIAGNOSIS — M47.816 ARTHRITIS, LUMBAR SPINE: ICD-10-CM

## 2024-12-04 DIAGNOSIS — I10 PRIMARY HYPERTENSION: ICD-10-CM

## 2024-12-04 PROCEDURE — 3075F SYST BP GE 130 - 139MM HG: CPT | Performed by: INTERNAL MEDICINE

## 2024-12-04 PROCEDURE — 1159F MED LIST DOCD IN RCRD: CPT | Performed by: INTERNAL MEDICINE

## 2024-12-04 PROCEDURE — 90677 PCV20 VACCINE IM: CPT | Performed by: INTERNAL MEDICINE

## 2024-12-04 PROCEDURE — G0009 ADMIN PNEUMOCOCCAL VACCINE: HCPCS | Performed by: INTERNAL MEDICINE

## 2024-12-04 PROCEDURE — 3078F DIAST BP <80 MM HG: CPT | Performed by: INTERNAL MEDICINE

## 2024-12-04 PROCEDURE — 99214 OFFICE O/P EST MOD 30 MIN: CPT | Performed by: INTERNAL MEDICINE

## 2024-12-04 NOTE — PROGRESS NOTES
Subjective   Patient ID: Davon Lockwood is a 89 y.o. male who presents for Follow-up.    Coronary artery disease  Persistent atrial fibrillation, slow ventricular rate  Aortic valve stenosis status post aortic valve replacement  History of diastolic heart failure  Primary hypertension  Hyperlipidemia  No VILLALPANDO, chest pain, palpitations.  Slow HR, though no symptoms of dizziness.  Routine follow-up with [facility] cardiology, Dr. Russell, next visit in 2/2025     Hypothyroidism  Stable on treatment     Impaired fasting glucose   On Jardiance for CHF.  Glucose stable.    GERD/heartburn  No symptoms on pantoprazole 40 mg daily     Prostate cancer with elevated PSA (remote radiation therapy in 2008).  Current PSA 14.1  Complex renal cyst  Recent visit with Dr. Miller, no intervention, follow-up in 1 year    Vitamin D deficiency   Stable on vitamin D 2000 IU daily (with food)    Suspected right upper extremity skin cancer (2 locations)  History of skin cancer/actinic keratoses  Upcoming appointment with Dr. Mcguire scheduled in January.  Patient advised to have him review areas on right upper extremity     Lumbar degenerative arthritis with kyphosis/secondary gait disorder  Keep active as able  Continue use of cane    Vasomotor rhinitis  Option of ipratropium nasal spray discussed, patient defers at this time    Potential bilateral hearing loss  Option of audiology referral discussed, patient defers at this time     Health maintenance  High-dose influenza and COVID-19 vaccine are up-to-date  Prevnar-20 vaccine given today.    Follow-up  Wellness exam/physical in 4/2025 (as scheduled)  (Fasting lab work be ordered to complete 3 to 7 days prior to visit)         Review of Systems   Constitutional:  Negative for fatigue and unexpected weight change.   Eyes:  Negative for visual disturbance.   Respiratory:  Negative for cough and shortness of breath.    Cardiovascular:  Negative for chest pain, palpitations and leg swelling.    Gastrointestinal:  Negative for abdominal pain, constipation and diarrhea.   Genitourinary:  Negative for dysuria, frequency and urgency.   Musculoskeletal:  Negative for back pain.   Neurological:  Negative for dizziness and headaches.   Psychiatric/Behavioral:  Negative for dysphoric mood. The patient is not nervous/anxious.        Objective   /62 (BP Location: Left arm, Patient Position: Sitting, BP Cuff Size: Adult)   Pulse 50   Wt 86.6 kg (191 lb)   SpO2 95%   BMI 27.41 kg/m²     Physical Exam  Vitals reviewed.   Constitutional:       Appearance: Normal appearance.   HENT:      Head: Normocephalic.      Right Ear: Tympanic membrane normal.      Left Ear: Tympanic membrane normal.      Mouth/Throat:      Mouth: Mucous membranes are moist.      Pharynx: No oropharyngeal exudate or posterior oropharyngeal erythema.   Eyes:      Conjunctiva/sclera: Conjunctivae normal.   Neck:      Vascular: No carotid bruit.   Cardiovascular:      Rate and Rhythm: Normal rate and regular rhythm.      Pulses: Normal pulses.   Pulmonary:      Effort: Pulmonary effort is normal.      Breath sounds: Normal breath sounds. No wheezing or rales.   Abdominal:      General: Bowel sounds are normal.      Palpations: Abdomen is soft.      Tenderness: There is no abdominal tenderness.   Musculoskeletal:      Right lower leg: No edema.      Left lower leg: No edema.      Comments: Kyphoscoliosis   Lymphadenopathy:      Cervical: No cervical adenopathy.   Skin:     General: Skin is warm.      Comments: Various scaly areas on scalp  Right upper extremity with 2 scaly areas concerning for possible carcinoma   Neurological:      General: No focal deficit present.      Mental Status: He is alert and oriented to person, place, and time.   Psychiatric:         Mood and Affect: Mood normal.         Assessment/Plan     Coronary artery disease  Persistent atrial fibrillation, slow ventricular rate  Aortic valve stenosis status post aortic  valve replacement  History of diastolic heart failure  Primary hypertension  Hyperlipidemia  Continue current medication regimen  Continue home blood pressure monitoring.  Bring readings and device to next visit.  Continue routine follow-up with [facility] cardiology, Dr. Russell, next visit in 2/2025     Hypothyroidism  Continue levothyroxine 50 mcg daily     Impaired fasting glucose   Maintain low carbohydrate/low sugar diet  Continue Jardiance 10 mg daily (originally prescribed for diastolic heart failure per cardiology)     GERD/heartburn  Continue pantoprazole 40 mg daily     Prostate cancer with elevated PSA (remote radiation therapy in 2008).  Current PSA 14.1  Complex renal cyst  Continue annual follow-up with [facility] urology, Dr. Miller,     Vitamin D deficiency   Continue vitamin D 2000 IU daily (with food)    Suspected right upper extremity skin cancer (2 locations)  History of skin cancer/actinic keratoses  Upcoming appointment with Dr. Mcguire scheduled in January.  Patient advised to have him review areas on right upper extremity     Lumbar degenerative arthritis with kyphosis/secondary gait disorder  Keep active as able  Continue use of cane    Vasomotor rhinitis  Option of ipratropium nasal spray discussed, patient defers at this time    Potential bilateral hearing loss  Option of audiology referral discussed, patient defers at this time     Health maintenance  High-dose influenza and COVID-19 vaccine are up-to-date  Prevnar-20 vaccine given today.    Follow-up  Wellness exam/physical in 4/2025 (as scheduled)  (Fasting lab work be ordered to complete 3 to 7 days prior to visit)    Shabbir Vaughan MD

## 2024-12-04 NOTE — PATIENT INSTRUCTIONS
Coronary artery disease  Persistent atrial fibrillation, slow ventricular rate  Aortic valve stenosis status post aortic valve replacement  History of diastolic heart failure  Primary hypertension  Hyperlipidemia  Continue current medication regimen  Continue home blood pressure monitoring.  Bring readings and device to next visit.  Continue routine follow-up with [facility] cardiology, Dr. Russell, next visit in 2/2025     Hypothyroidism  Continue levothyroxine 50 mcg daily     Impaired fasting glucose   Maintain low carbohydrate/low sugar diet  Continue Jardiance 10 mg daily (originally prescribed for diastolic heart failure per cardiology)     GERD/heartburn  Continue pantoprazole 40 mg daily     Prostate cancer with elevated PSA (remote radiation therapy in 2008).  Current PSA 14.1  Complex renal cyst  Continue annual follow-up with [facility] urology, Dr. Miller,     Vitamin D deficiency   Continue vitamin D 2000 IU daily (with food)    Suspected right upper extremity skin cancer (2 locations)  History of skin cancer/actinic keratoses  Upcoming appointment with Dr. Mcguire scheduled in January.  Patient advised to have him review areas on right upper extremity     Lumbar degenerative arthritis with kyphosis/secondary gait disorder  Keep active as able  Continue use of cane    Vasomotor rhinitis  Option of ipratropium nasal spray discussed, patient defers at this time    Potential bilateral hearing loss  Option of audiology referral discussed, patient defers at this time     Health maintenance  High-dose influenza and COVID-19 vaccine are up-to-date  Prevnar-20 vaccine given today.    Follow-up  Wellness exam/physical in 4/2025 (as scheduled)  (Fasting lab work be ordered to complete 3 to 7 days prior to visit)

## 2024-12-05 DIAGNOSIS — J00 ACUTE RHINITIS: ICD-10-CM

## 2024-12-05 NOTE — TELEPHONE ENCOUNTER
Adebayo would like to try Ipratropium nasal spray.  Pended for [facility] pharmacy.  Please sign.  Thanks

## 2024-12-06 RX ORDER — IPRATROPIUM BROMIDE 21 UG/1
2 SPRAY, METERED NASAL 2 TIMES DAILY
Qty: 30 ML | Refills: 3 | Status: SHIPPED | OUTPATIENT
Start: 2024-12-06 | End: 2025-12-06

## 2024-12-07 DIAGNOSIS — Z00.00 WELLNESS EXAMINATION: Primary | ICD-10-CM

## 2024-12-07 ASSESSMENT — ENCOUNTER SYMPTOMS
DYSURIA: 0
PALPITATIONS: 0
COUGH: 0
SHORTNESS OF BREATH: 0
DIZZINESS: 0
ABDOMINAL PAIN: 0
HEADACHES: 0
CONSTIPATION: 0
DIARRHEA: 0
FATIGUE: 0
UNEXPECTED WEIGHT CHANGE: 0
FREQUENCY: 0
DYSPHORIC MOOD: 0
NERVOUS/ANXIOUS: 0
BACK PAIN: 0

## 2025-01-01 DIAGNOSIS — J00 ACUTE RHINITIS: ICD-10-CM

## 2025-01-02 RX ORDER — IPRATROPIUM BROMIDE 21 UG/1
2 SPRAY, METERED NASAL 2 TIMES DAILY
Qty: 30 ML | Refills: 5 | Status: SHIPPED | OUTPATIENT
Start: 2025-01-02 | End: 2026-01-02

## 2025-01-04 DIAGNOSIS — E78.2 MIXED HYPERLIPIDEMIA: ICD-10-CM

## 2025-01-04 RX ORDER — ROSUVASTATIN CALCIUM 10 MG/1
10 TABLET, COATED ORAL NIGHTLY
Qty: 90 TABLET | Refills: 3 | Status: SHIPPED | OUTPATIENT
Start: 2025-01-04

## 2025-01-16 DIAGNOSIS — J00 ACUTE RHINITIS: ICD-10-CM

## 2025-01-16 RX ORDER — IPRATROPIUM BROMIDE 21 UG/1
SPRAY, METERED NASAL
Qty: 30 ML | Refills: 11 | Status: SHIPPED | OUTPATIENT
Start: 2025-01-16

## 2025-01-26 DIAGNOSIS — I48.11 LONGSTANDING PERSISTENT ATRIAL FIBRILLATION (MULTI): ICD-10-CM

## 2025-01-26 DIAGNOSIS — J00 ACUTE RHINITIS: ICD-10-CM

## 2025-01-26 RX ORDER — APIXABAN 5 MG/1
5 TABLET, FILM COATED ORAL 2 TIMES DAILY
Qty: 180 TABLET | Refills: 1 | Status: SHIPPED | OUTPATIENT
Start: 2025-01-26

## 2025-01-26 RX ORDER — IPRATROPIUM BROMIDE 21 UG/1
SPRAY, METERED NASAL
Qty: 30 ML | Refills: 4 | Status: SHIPPED | OUTPATIENT
Start: 2025-01-26

## 2025-02-19 DIAGNOSIS — K21.9 GASTROESOPHAGEAL REFLUX DISEASE, UNSPECIFIED WHETHER ESOPHAGITIS PRESENT: ICD-10-CM

## 2025-02-19 RX ORDER — PANTOPRAZOLE SODIUM 40 MG/1
40 TABLET, DELAYED RELEASE ORAL DAILY
Qty: 90 TABLET | Refills: 3 | Status: SHIPPED | OUTPATIENT
Start: 2025-02-19

## 2025-04-09 DIAGNOSIS — E03.9 ACQUIRED HYPOTHYROIDISM: ICD-10-CM

## 2025-04-09 RX ORDER — LEVOTHYROXINE SODIUM 50 UG/1
50 TABLET ORAL DAILY
Qty: 90 TABLET | Refills: 3 | Status: SHIPPED | OUTPATIENT
Start: 2025-04-09

## 2025-04-17 ENCOUNTER — LAB (OUTPATIENT)
Dept: LAB | Facility: HOSPITAL | Age: OVER 89
End: 2025-04-17
Payer: MEDICARE

## 2025-04-17 DIAGNOSIS — Z00.00 ENCOUNTER FOR GENERAL ADULT MEDICAL EXAMINATION WITHOUT ABNORMAL FINDINGS: Primary | ICD-10-CM

## 2025-04-17 DIAGNOSIS — Z00.00 WELLNESS EXAMINATION: ICD-10-CM

## 2025-04-17 LAB
25(OH)D3 SERPL-MCNC: 49 NG/ML (ref 30–100)
ALBUMIN SERPL BCP-MCNC: 4.6 G/DL (ref 3.4–5)
ALP SERPL-CCNC: 48 U/L (ref 33–136)
ALT SERPL W P-5'-P-CCNC: 15 U/L (ref 10–52)
ANION GAP SERPL CALC-SCNC: 15 MMOL/L (ref 10–20)
APPEARANCE UR: CLEAR
AST SERPL W P-5'-P-CCNC: 20 U/L (ref 9–39)
BASOPHILS # BLD AUTO: 0.03 X10*3/UL (ref 0–0.1)
BASOPHILS NFR BLD AUTO: 0.6 %
BILIRUB SERPL-MCNC: 1.4 MG/DL (ref 0–1.2)
BILIRUB UR STRIP.AUTO-MCNC: NEGATIVE MG/DL
BUN SERPL-MCNC: 32 MG/DL (ref 6–23)
CALCIUM SERPL-MCNC: 9.6 MG/DL (ref 8.6–10.6)
CHLORIDE SERPL-SCNC: 102 MMOL/L (ref 98–107)
CHOLEST SERPL-MCNC: 127 MG/DL (ref 0–199)
CHOLESTEROL/HDL RATIO: 2.2
CO2 SERPL-SCNC: 27 MMOL/L (ref 21–32)
COLOR UR: YELLOW
CREAT SERPL-MCNC: 1.09 MG/DL (ref 0.5–1.3)
CRP SERPL HS-MCNC: 0.8 MG/L
EGFRCR SERPLBLD CKD-EPI 2021: 64 ML/MIN/1.73M*2
EOSINOPHIL # BLD AUTO: 0.15 X10*3/UL (ref 0–0.4)
EOSINOPHIL NFR BLD AUTO: 3 %
ERYTHROCYTE [DISTWIDTH] IN BLOOD BY AUTOMATED COUNT: 14.2 % (ref 11.5–14.5)
EST. AVERAGE GLUCOSE BLD GHB EST-MCNC: 117 MG/DL
GLUCOSE SERPL-MCNC: 110 MG/DL (ref 74–99)
GLUCOSE UR STRIP.AUTO-MCNC: ABNORMAL MG/DL
HBA1C MFR BLD: 5.7 % (ref ?–5.7)
HCT VFR BLD AUTO: 47.5 % (ref 41–52)
HDLC SERPL-MCNC: 56.7 MG/DL
HGB BLD-MCNC: 14.7 G/DL (ref 13.5–17.5)
IMM GRANULOCYTES # BLD AUTO: 0.01 X10*3/UL (ref 0–0.5)
IMM GRANULOCYTES NFR BLD AUTO: 0.2 % (ref 0–0.9)
KETONES UR STRIP.AUTO-MCNC: NEGATIVE MG/DL
LDLC SERPL CALC-MCNC: 59 MG/DL
LEUKOCYTE ESTERASE UR QL STRIP.AUTO: NEGATIVE
LYMPHOCYTES # BLD AUTO: 1.17 X10*3/UL (ref 0.8–3)
LYMPHOCYTES NFR BLD AUTO: 23.1 %
MCH RBC QN AUTO: 33.2 PG (ref 26–34)
MCHC RBC AUTO-ENTMCNC: 30.9 G/DL (ref 32–36)
MCV RBC AUTO: 107 FL (ref 80–100)
MONOCYTES # BLD AUTO: 0.52 X10*3/UL (ref 0.05–0.8)
MONOCYTES NFR BLD AUTO: 10.3 %
NEUTROPHILS # BLD AUTO: 3.19 X10*3/UL (ref 1.6–5.5)
NEUTROPHILS NFR BLD AUTO: 62.8 %
NITRITE UR QL STRIP.AUTO: NEGATIVE
NON HDL CHOLESTEROL: 70 MG/DL (ref 0–149)
NRBC BLD-RTO: 0 /100 WBCS (ref 0–0)
PH UR STRIP.AUTO: 6 [PH]
PLATELET # BLD AUTO: 106 X10*3/UL (ref 150–450)
POTASSIUM SERPL-SCNC: 3.9 MMOL/L (ref 3.5–5.3)
PROT SERPL-MCNC: 7 G/DL (ref 6.4–8.2)
PROT UR STRIP.AUTO-MCNC: NEGATIVE MG/DL
PSA SERPL-MCNC: 10.84 NG/ML
RBC # BLD AUTO: 4.43 X10*6/UL (ref 4.5–5.9)
RBC # UR STRIP.AUTO: NEGATIVE MG/DL
SODIUM SERPL-SCNC: 140 MMOL/L (ref 136–145)
SP GR UR STRIP.AUTO: 1.03
TRIGL SERPL-MCNC: 59 MG/DL (ref 0–149)
TSH SERPL-ACNC: 2.17 MIU/L (ref 0.44–3.98)
UROBILINOGEN UR STRIP.AUTO-MCNC: NORMAL MG/DL
VLDL: 12 MG/DL (ref 0–40)
WBC # BLD AUTO: 5.1 X10*3/UL (ref 4.4–11.3)

## 2025-04-17 PROCEDURE — 86141 C-REACTIVE PROTEIN HS: CPT

## 2025-04-17 PROCEDURE — 36415 COLL VENOUS BLD VENIPUNCTURE: CPT

## 2025-04-17 PROCEDURE — 84153 ASSAY OF PSA TOTAL: CPT

## 2025-04-17 PROCEDURE — 80061 LIPID PANEL: CPT

## 2025-04-17 PROCEDURE — 85025 COMPLETE CBC W/AUTO DIFF WBC: CPT

## 2025-04-17 PROCEDURE — 84443 ASSAY THYROID STIM HORMONE: CPT

## 2025-04-17 PROCEDURE — 83036 HEMOGLOBIN GLYCOSYLATED A1C: CPT

## 2025-04-17 PROCEDURE — 80053 COMPREHEN METABOLIC PANEL: CPT

## 2025-04-17 PROCEDURE — 81003 URINALYSIS AUTO W/O SCOPE: CPT

## 2025-04-17 PROCEDURE — 82306 VITAMIN D 25 HYDROXY: CPT

## 2025-04-19 ASSESSMENT — PROMIS GLOBAL HEALTH SCALE
RATE_MENTAL_HEALTH: EXCELLENT
RATE_QUALITY_OF_LIFE: EXCELLENT
RATE_AVERAGE_FATIGUE: MILD
RATE_AVERAGE_PAIN: 2
CARRYOUT_PHYSICAL_ACTIVITIES: MODERATELY
EMOTIONAL_PROBLEMS: NEVER
RATE_PHYSICAL_HEALTH: GOOD
RATE_GENERAL_HEALTH: VERY GOOD
CARRYOUT_SOCIAL_ACTIVITIES: VERY GOOD
RATE_SOCIAL_SATISFACTION: GOOD

## 2025-04-23 ENCOUNTER — APPOINTMENT (OUTPATIENT)
Dept: PRIMARY CARE | Facility: CLINIC | Age: OVER 89
End: 2025-04-23
Payer: MEDICARE

## 2025-04-23 VITALS
WEIGHT: 191 LBS | HEART RATE: 46 BPM | OXYGEN SATURATION: 97 % | DIASTOLIC BLOOD PRESSURE: 68 MMHG | SYSTOLIC BLOOD PRESSURE: 130 MMHG | HEIGHT: 70 IN | BODY MASS INDEX: 27.35 KG/M2

## 2025-04-23 DIAGNOSIS — R97.21 RISING PSA FOLLOWING TREATMENT FOR MALIGNANT NEOPLASM OF PROSTATE: ICD-10-CM

## 2025-04-23 DIAGNOSIS — R73.01 IMPAIRED FASTING GLUCOSE: ICD-10-CM

## 2025-04-23 DIAGNOSIS — M15.0 PRIMARY OSTEOARTHRITIS INVOLVING MULTIPLE JOINTS: ICD-10-CM

## 2025-04-23 DIAGNOSIS — E78.2 MIXED HYPERLIPIDEMIA: ICD-10-CM

## 2025-04-23 DIAGNOSIS — R26.9 GAIT DISORDER: ICD-10-CM

## 2025-04-23 DIAGNOSIS — Z95.2 S/P AVR (AORTIC VALVE REPLACEMENT): ICD-10-CM

## 2025-04-23 DIAGNOSIS — K21.9 GASTROESOPHAGEAL REFLUX DISEASE, UNSPECIFIED WHETHER ESOPHAGITIS PRESENT: ICD-10-CM

## 2025-04-23 DIAGNOSIS — Z85.828 HISTORY OF SKIN CANCER: ICD-10-CM

## 2025-04-23 DIAGNOSIS — E55.9 VITAMIN D DEFICIENCY: ICD-10-CM

## 2025-04-23 DIAGNOSIS — Z00.00 MEDICARE ANNUAL WELLNESS VISIT, SUBSEQUENT: Primary | ICD-10-CM

## 2025-04-23 DIAGNOSIS — M47.816 ARTHRITIS, LUMBAR SPINE: ICD-10-CM

## 2025-04-23 DIAGNOSIS — D69.6 THROMBOCYTOPENIA (CMS-HCC): ICD-10-CM

## 2025-04-23 DIAGNOSIS — J30.0 VASOMOTOR RHINITIS: ICD-10-CM

## 2025-04-23 DIAGNOSIS — I48.11 LONGSTANDING PERSISTENT ATRIAL FIBRILLATION (MULTI): ICD-10-CM

## 2025-04-23 DIAGNOSIS — I25.10 CORONARY ARTERY DISEASE INVOLVING NATIVE CORONARY ARTERY OF NATIVE HEART WITHOUT ANGINA PECTORIS: ICD-10-CM

## 2025-04-23 DIAGNOSIS — Z00.00 WELLNESS EXAMINATION: ICD-10-CM

## 2025-04-23 DIAGNOSIS — E03.9 ACQUIRED HYPOTHYROIDISM: ICD-10-CM

## 2025-04-23 DIAGNOSIS — C61 ADENOCARCINOMA OF PROSTATE (MULTI): ICD-10-CM

## 2025-04-23 DIAGNOSIS — I10 PRIMARY HYPERTENSION: ICD-10-CM

## 2025-04-23 DIAGNOSIS — N20.0 KIDNEY STONES: ICD-10-CM

## 2025-04-23 PROCEDURE — G0439 PPPS, SUBSEQ VISIT: HCPCS | Performed by: INTERNAL MEDICINE

## 2025-04-23 PROCEDURE — 3075F SYST BP GE 130 - 139MM HG: CPT | Performed by: INTERNAL MEDICINE

## 2025-04-23 PROCEDURE — UHSPHYS PR UH SELECT PHYSICAL: Performed by: INTERNAL MEDICINE

## 2025-04-23 PROCEDURE — 1159F MED LIST DOCD IN RCRD: CPT | Performed by: INTERNAL MEDICINE

## 2025-04-23 PROCEDURE — 3078F DIAST BP <80 MM HG: CPT | Performed by: INTERNAL MEDICINE

## 2025-04-23 PROCEDURE — 1036F TOBACCO NON-USER: CPT | Performed by: INTERNAL MEDICINE

## 2025-04-23 ASSESSMENT — ENCOUNTER SYMPTOMS
HEADACHES: 0
DEPRESSION: 0
LIGHT-HEADEDNESS: 0
NERVOUS/ANXIOUS: 0
SHORTNESS OF BREATH: 0
HEARTBURN: 0
DYSURIA: 0
WHEEZING: 0
FREQUENCY: 0
DYSPNEA ON EXERTION: 0
FEVER: 0
BACK PAIN: 1
CONSTIPATION: 0
NUMBNESS: 0
SORE THROAT: 0
WEIGHT GAIN: 0
OCCASIONAL FEELINGS OF UNSTEADINESS: 0
ABDOMINAL PAIN: 0
DIZZINESS: 0
LOSS OF SENSATION IN FEET: 0
COUGH: 0
MUSCLE CRAMPS: 0
DIARRHEA: 0
PALPITATIONS: 0
BLURRED VISION: 0

## 2025-04-23 NOTE — PATIENT INSTRUCTIONS
Wellness exam/physical ([facility])  Continued walking for exercise is recommended  Continued well-balanced diet rich in fruits, vegetables, fiber, lean protein recommended  Continued routine follow-up with ophthalmology and dentistry recommended  Patient is eligible for COVID-19 booster (to receive at pharmacy)  Providing a copy of advance directives (DURABLE POWER OF  for healthcare) to place in chart recommended    Coronary artery disease  Persistent atrial fibrillation, slow ventricular rate  Aortic valve stenosis status post aortic valve replacement  Diastolic heart failure  Primary hypertension  Hyperlipidemia  Continue current medication regimen with lisinopril/HCT, low-dose furosemide, Eliquis, aspirin, rosuvastatin  Continue routine follow-up with [facility] cardiology, Dr. Russell, next visit in 8/2025     Hypothyroidism  Continue levothyroxine 50 mcg daily     Impaired fasting glucose (hemoglobin A1c 5.7%)  Maintain low carbohydrate/low sugar diet  Continue Jardiance 10 mg daily (originally prescribed for diastolic heart failure per cardiology)     GERD/heartburn  Continue pantoprazole 40 mg daily    Thrombocytopenia/low platelet count (new onset)  Repeat lab work including CBC/B12/folic acid/iron stores in 1 month, week of 5/19  Consider future hematology consult depending on platelet count    Prostate cancer with elevated PSA (remote radiation therapy in 2008).  Current PSA 10.8 (watchful waiting protocol)  Complex renal cyst  Continue annual follow-up with [facility], Dr. Miller and team, next appointment scheduled in November     Vitamin D deficiency   Continue vitamin D 2000 IU daily (with food)    History of skin cancer/actinic keratoses  Continue routine follow-up with dermatology, Dr. Mcguire     Lumbar degenerative arthritis with kyphosis/secondary gait disorder  Osteoarthritis of multiple joints  Recommend trial over-the-counter lidocaine patch (brand Salonpas), apply to left lower back in the  morning/remove at bedtime  Recommend use of ambulatory device, currently using cane-rollator recommended     Vasomotor rhinitis  Continue ipratropium nasal spray as needed     Follow-up  1.  Office visit in October  (Additional lab work ordered to complete prior to visit)  2.  Wellness exam/Medicare annual wellness visit in 1 year

## 2025-04-23 NOTE — PROGRESS NOTES
Davon Lockwood is a 89 y.o. year old here for a Medicare Annual Wellness Visit     Health Risk Assessment  In general, health is:  Good     Concerns with balance:  Yes     Concerns with teeth or dentures:  No     Concerns with sexual function:  No     Felt anxious, stressed, angry, irritable, lonely, isolated, or had thoughts of hurting themself:  No     Has little interest or pleasure in doing things:  No     Bothered by feeling down, depressed, or hopeless:  No     Needs help with grocery shopping, cooking, housework, bathing, grooming, dressing, eating, sitting or standing, walking, using the toilet, handling finances, taking medications, using the telephone, or driving:  No     Following safety precautions in the home environment and vehicle: removed throw rugs from floors, installed grab bars in the bathroom, handrails in stairwells, having adequate lighting, wearing seatbelt at all times?:  Yes     Smokes cigarettes, vapes, or chew tobacco:  No     Eats healthy foods including fruits, vegetables, whole grains, and fiber-rich foods:  Yes     Number of days per week engages in exercise:  4 days     Average alcohol consumption: 7-14 glassess/week        Current Providers  Specialists: I have reviewed specialist-related care of the patient in the medical record.     Medical/Family history review  Reviewed and updated problem list, medical/surgical/family/social history, medications, and allergies.     Opioid use review  Patient use of opioids:  No           Depression screening  Depression Screening PHQ-2 Score   2/14/2023 0         Depression screening tool completed and reviewed. Based on score and interview, patient is not at risk for depression. Screening tool discussed with patient, and I recommended no further intervention at this time.     Cognitive screening  Mini Cog Score:  5/5     Cognitive screening reviewed and plan:  Yes     Functional Observation  Was the patient's timed Up & Go test unsteady or >=  "12 seconds?  Yes     Advance Care Planning  End of Life planning discussed, including patient's advanced directive wishes:  Yes    Vision and Hearing assessment  Visual acuity (required for Welcome to Medicare):  Ophthalmology  Hearing Evaluation:  Hearing loss    ====================================================    /68 (BP Location: Left arm, Patient Position: Sitting, BP Cuff Size: Adult)   Pulse (!) 46   Ht 1.778 m (5' 10\")   Wt 86.6 kg (191 lb)   SpO2 97%   BMI 27.41 kg/m²     Chief Complaint   Patient presents with    Annual Exam     Mini-cog score 5/5    Shortness of Breath       Wellness exam/physical ( [facility])    Overall, patient states that he is doing fairly well.  He has no new complaints.    Coronary artery disease  Persistent atrial fibrillation  Aortic valve stenosis status post aortic valve replacement  Diastolic heart failure  Primary hypertension  Hyperlipidemia  Lipid panel reviewed and values are at goal.  No palpitations, increased shortness of breath, lightheadedness.  He has baseline slow ventricular rate, though has not been felt to be a candidate for pacemaker yet per cardiology.  Next appointment with cardiology is doing August.     Hypothyroidism  Weight is stable.  No change in bowel function.  Energy level is adequate.  TSH is at goal.     Impaired fasting glucose (hemoglobin A1c 5.7%)  Hemoglobin A1C is 5.7%.  He is on Jardiance for HF.  Proper diet reviewed    GERD/heartburn  No breakthrough heartburn on PPI    Thrombocytopenia  New onset on labs.  No new meds.    Prostate cancer with elevated PSA (remote radiation therapy in 2008)   Current PSA is 10.8%, previously 14.1 in November at [facility] urology appointment.  No change in urinary function.  Patient is on watchful waiting.  Option of treatment discussed with patient at last urology appointment as well as today and he prefers to not pursue active treatment at this time based on goals of care.     Vitamin D deficiency "   Current vitamin D level is at goal on vitamin D 2000 IU daily    History of skin cancer/actinic keratoses  Up-to-date with dermatology follow-up.  He has had recent skin cancer removed per Dr. Whitaker.     Lumbar degenerative arthritis with kyphosis/secondary gait disorder  Osteoarthritis of multiple joints  Patient is using cane for ambulation.  I have advised better to use rollator or walker.  Currently, primary focus of pain is left lower back when walking.  No medication taken.     Vasomotor rhinitis  Chronic intermittent runny nose for which ipratropium prescribed and has been helpful.  Using as needed.     Health maintenance  Exercise: Walking around the home for 10 to 15 minutes, also doing yard work and work around the home  Ophthalmology: Dr. Nguyen, up-to-date  Dentistry: Up-to-date  Dermatology: Up-to-date, see above        Review of Systems   Constitutional: Negative for fever, malaise/fatigue and weight gain.   HENT:  Positive for hearing loss. Negative for sore throat.    Eyes:  Negative for blurred vision and visual disturbance.   Cardiovascular:  Negative for chest pain, dyspnea on exertion and palpitations.   Respiratory:  Negative for cough, shortness of breath and wheezing.    Skin:  Negative for rash.   Musculoskeletal:  Positive for arthritis and back pain. Negative for joint pain and muscle cramps.   Gastrointestinal:  Negative for abdominal pain, constipation, diarrhea and heartburn.   Genitourinary:  Negative for dysuria, frequency and urgency.   Neurological:  Negative for dizziness, headaches, light-headedness and numbness.   Psychiatric/Behavioral:  Negative for depression. The patient is not nervous/anxious.         Physical Exam  Vitals reviewed.   Constitutional:       Appearance: Normal appearance.   HENT:      Head: Normocephalic.      Right Ear: Tympanic membrane normal.      Left Ear: Tympanic membrane normal.      Mouth/Throat:      Mouth: Mucous membranes are moist.       Pharynx: No oropharyngeal exudate or posterior oropharyngeal erythema.   Eyes:      Conjunctiva/sclera: Conjunctivae normal.   Neck:      Vascular: No carotid bruit.   Cardiovascular:      Rate and Rhythm: Normal rate. Rhythm irregular.      Heart sounds: Murmur heard.      Systolic murmur is present with a grade of 1/6.   Pulmonary:      Effort: Pulmonary effort is normal.      Breath sounds: Normal breath sounds. No wheezing or rales.   Abdominal:      General: Bowel sounds are normal.      Palpations: Abdomen is soft.      Tenderness: There is no abdominal tenderness.   Musculoskeletal:      Right lower leg: No edema.      Left lower leg: No edema.      Comments: Back exam: Kyphoscoliosis, left lower lumbar paraspinal tenderness.  Bilateral hips and knees with intact range of motion.  Mild crepitus with right knee range of motion   Lymphadenopathy:      Cervical: No cervical adenopathy.   Skin:     General: Skin is warm.      Comments: Scattered skin lesions on forehead and scalp (followed by dermatology)   Neurological:      General: No focal deficit present.      Mental Status: He is alert and oriented to person, place, and time.   Psychiatric:         Mood and Affect: Mood normal.         Assessment and Plan    Medicare annual wellness visit (subsequent)  Continued walking for exercise is recommended  Continued well-balanced diet rich in fruits, vegetables, fiber, lean protein recommended  Continued routine follow-up with ophthalmology and dentistry recommended  Patient is eligible for COVID-19 booster (to receive at pharmacy)  Providing a copy of advance directives (DURABLE POWER OF  for healthcare) to place in chart recommended    ===============================    Wellness exam/physical ( [facility])  Continued walking for exercise is recommended  Continued well-balanced diet rich in fruits, vegetables, fiber, lean protein recommended  Continued routine follow-up with ophthalmology and dentistry  recommended  Patient is eligible for COVID-19 booster (to receive at pharmacy)  Providing a copy of advance directives (DURABLE POWER OF  for healthcare) to place in chart recommended    Coronary artery disease  Persistent atrial fibrillation, slow ventricular rate  Aortic valve stenosis status post aortic valve replacement  Diastolic heart failure  Primary hypertension  Hyperlipidemia  Continue current medication regimen with lisinopril/HCT, low-dose furosemide, Eliquis, aspirin, rosuvastatin  Continue routine follow-up with [facility] cardiology, Dr. Russell, next visit in 8/2025     Hypothyroidism  Continue levothyroxine 50 mcg daily     Impaired fasting glucose (hemoglobin A1c 5.7%)  Maintain low carbohydrate/low sugar diet  Continue Jardiance 10 mg daily (originally prescribed for diastolic heart failure per cardiology)     GERD/heartburn  Continue pantoprazole 40 mg daily    Thrombocytopenia/low platelet count (new onset)  Repeat lab work including CBC/B12/folic acid/iron stores in 1 month, week of 5/19  Consider future hematology consult depending on platelet count    Prostate cancer with elevated PSA (remote radiation therapy in 2008).  Current PSA 10.8 (watchful waiting protocol)  Complex renal cyst  Continue annual follow-up with [facility], Dr. Miller and team, next appointment scheduled in November     Vitamin D deficiency   Continue vitamin D 2000 IU daily (with food)    History of skin cancer/actinic keratoses  Continue routine follow-up with dermatology, Dr. Mcguire     Lumbar degenerative arthritis with kyphosis/secondary gait disorder  Osteoarthritis of multiple joints  Recommend trial over-the-counter lidocaine patch (brand Salonpas), apply to left lower back in the morning/remove at bedtime  Recommend use of ambulatory device, currently using cane-rollator recommended     Vasomotor rhinitis  Continue ipratropium nasal spray as needed     Follow-up  1.  Office visit in October  (Additional lab work  ordered to complete prior to visit)  2.  Wellness exam/Medicare annual wellness visit in 1 year    Shabbir Vaughan MD

## 2025-05-19 DIAGNOSIS — D69.6 THROMBOCYTOPENIA: ICD-10-CM

## 2025-05-21 LAB
BASOPHILS # BLD AUTO: 32 CELLS/UL (ref 0–200)
BASOPHILS NFR BLD AUTO: 0.6 %
EOSINOPHIL # BLD AUTO: 162 CELLS/UL (ref 15–500)
EOSINOPHIL NFR BLD AUTO: 3 %
ERYTHROCYTE [DISTWIDTH] IN BLOOD BY AUTOMATED COUNT: 12.6 % (ref 11–15)
FERRITIN SERPL-MCNC: 94 NG/ML (ref 24–380)
FOLATE SERPL-MCNC: 15.1 NG/ML
HCT VFR BLD AUTO: 45.7 % (ref 38.5–50)
HGB BLD-MCNC: 15.3 G/DL (ref 13.2–17.1)
IRON SATN MFR SERPL: 45 % (CALC) (ref 20–48)
IRON SERPL-MCNC: 140 MCG/DL (ref 50–180)
LYMPHOCYTES # BLD AUTO: 1199 CELLS/UL (ref 850–3900)
LYMPHOCYTES NFR BLD AUTO: 22.2 %
MCH RBC QN AUTO: 34.2 PG (ref 27–33)
MCHC RBC AUTO-ENTMCNC: 33.5 G/DL (ref 32–36)
MCV RBC AUTO: 102 FL (ref 80–100)
MONOCYTES # BLD AUTO: 518 CELLS/UL (ref 200–950)
MONOCYTES NFR BLD AUTO: 9.6 %
NEUTROPHILS # BLD AUTO: 3488 CELLS/UL (ref 1500–7800)
NEUTROPHILS NFR BLD AUTO: 64.6 %
PLATELET # BLD AUTO: 166 THOUSAND/UL (ref 140–400)
PMV BLD REES-ECKER: 10.4 FL (ref 7.5–12.5)
RBC # BLD AUTO: 4.48 MILLION/UL (ref 4.2–5.8)
TIBC SERPL-MCNC: 311 MCG/DL (CALC) (ref 250–425)
VIT B12 SERPL-MCNC: 236 PG/ML (ref 200–1100)
WBC # BLD AUTO: 5.4 THOUSAND/UL (ref 3.8–10.8)

## 2025-07-01 DIAGNOSIS — I35.0 AORTIC VALVE STENOSIS, ETIOLOGY OF CARDIAC VALVE DISEASE UNSPECIFIED: ICD-10-CM

## 2025-07-01 DIAGNOSIS — Z95.2 S/P AVR (AORTIC VALVE REPLACEMENT): ICD-10-CM

## 2025-07-01 RX ORDER — FUROSEMIDE 40 MG/1
40 TABLET ORAL EVERY OTHER DAY
Qty: 45 TABLET | Refills: 3 | Status: SHIPPED | OUTPATIENT
Start: 2025-07-01

## 2025-07-01 NOTE — TELEPHONE ENCOUNTER
Furosemide 40 mg every other day pended for [facility] pharmacy.  Please sign.  Thank you

## 2025-07-04 DIAGNOSIS — I10 PRIMARY HYPERTENSION: ICD-10-CM

## 2025-07-04 RX ORDER — LISINOPRIL AND HYDROCHLOROTHIAZIDE 12.5; 2 MG/1; MG/1
1 TABLET ORAL 2 TIMES DAILY
Qty: 180 TABLET | Refills: 3 | Status: SHIPPED | OUTPATIENT
Start: 2025-07-04

## 2025-09-11 ENCOUNTER — APPOINTMENT (OUTPATIENT)
Dept: PRIMARY CARE | Facility: CLINIC | Age: OVER 89
End: 2025-09-11
Payer: MEDICARE

## 2025-10-27 ENCOUNTER — APPOINTMENT (OUTPATIENT)
Dept: PRIMARY CARE | Facility: CLINIC | Age: OVER 89
End: 2025-10-27
Payer: MEDICARE

## 2026-04-28 ENCOUNTER — APPOINTMENT (OUTPATIENT)
Dept: PRIMARY CARE | Facility: CLINIC | Age: OVER 89
End: 2026-04-28
Payer: MEDICARE